# Patient Record
Sex: FEMALE | Race: WHITE | NOT HISPANIC OR LATINO | URBAN - METROPOLITAN AREA
[De-identification: names, ages, dates, MRNs, and addresses within clinical notes are randomized per-mention and may not be internally consistent; named-entity substitution may affect disease eponyms.]

---

## 2017-07-18 ENCOUNTER — OUTPATIENT (OUTPATIENT)
Dept: OUTPATIENT SERVICES | Facility: HOSPITAL | Age: 77
LOS: 1 days | End: 2017-07-18
Payer: MEDICARE

## 2017-07-18 ENCOUNTER — APPOINTMENT (OUTPATIENT)
Dept: CARDIOLOGY | Facility: CLINIC | Age: 77
End: 2017-07-18

## 2017-07-18 ENCOUNTER — TRANSCRIPTION ENCOUNTER (OUTPATIENT)
Age: 77
End: 2017-07-18

## 2017-07-18 ENCOUNTER — NON-APPOINTMENT (OUTPATIENT)
Age: 77
End: 2017-07-18

## 2017-07-18 VITALS
OXYGEN SATURATION: 100 % | HEART RATE: 81 BPM | BODY MASS INDEX: 27.88 KG/M2 | TEMPERATURE: 98.8 F | HEIGHT: 60 IN | DIASTOLIC BLOOD PRESSURE: 88 MMHG | WEIGHT: 142 LBS | SYSTOLIC BLOOD PRESSURE: 168 MMHG

## 2017-07-18 VITALS
DIASTOLIC BLOOD PRESSURE: 92 MMHG | HEIGHT: 59 IN | HEART RATE: 91 BPM | SYSTOLIC BLOOD PRESSURE: 194 MMHG | RESPIRATION RATE: 16 BRPM | TEMPERATURE: 98 F | OXYGEN SATURATION: 99 % | WEIGHT: 141.98 LBS

## 2017-07-18 DIAGNOSIS — Z80.9 FAMILY HISTORY OF MALIGNANT NEOPLASM, UNSPECIFIED: ICD-10-CM

## 2017-07-18 DIAGNOSIS — Z84.89 FAMILY HISTORY OF OTHER SPECIFIED CONDITIONS: Chronic | ICD-10-CM

## 2017-07-18 DIAGNOSIS — R07.89 OTHER CHEST PAIN: ICD-10-CM

## 2017-07-18 DIAGNOSIS — Z78.9 OTHER SPECIFIED HEALTH STATUS: ICD-10-CM

## 2017-07-18 DIAGNOSIS — R07.9 CHEST PAIN, UNSPECIFIED: ICD-10-CM

## 2017-07-18 LAB
ALBUMIN SERPL ELPH-MCNC: 4.5 G/DL — SIGNIFICANT CHANGE UP (ref 3.3–5)
ALP SERPL-CCNC: 38 U/L — LOW (ref 40–120)
ALT FLD-CCNC: 16 U/L RC — SIGNIFICANT CHANGE UP (ref 10–45)
ANION GAP SERPL CALC-SCNC: 16 MMOL/L — SIGNIFICANT CHANGE UP (ref 5–17)
AST SERPL-CCNC: 22 U/L — SIGNIFICANT CHANGE UP (ref 10–40)
BILIRUB SERPL-MCNC: 0.4 MG/DL — SIGNIFICANT CHANGE UP (ref 0.2–1.2)
BUN SERPL-MCNC: 17 MG/DL — SIGNIFICANT CHANGE UP (ref 7–23)
CALCIUM SERPL-MCNC: 9.9 MG/DL — SIGNIFICANT CHANGE UP (ref 8.4–10.5)
CHLORIDE SERPL-SCNC: 105 MMOL/L — SIGNIFICANT CHANGE UP (ref 96–108)
CHOLEST SERPL-MCNC: 167 MG/DL — SIGNIFICANT CHANGE UP (ref 10–199)
CO2 SERPL-SCNC: 23 MMOL/L — SIGNIFICANT CHANGE UP (ref 22–31)
CREAT SERPL-MCNC: 0.78 MG/DL — SIGNIFICANT CHANGE UP (ref 0.5–1.3)
GLUCOSE SERPL-MCNC: 147 MG/DL — HIGH (ref 70–99)
HBA1C BLD-MCNC: 5.6 % — SIGNIFICANT CHANGE UP (ref 4–5.6)
HCT VFR BLD CALC: 31.2 % — LOW (ref 34.5–45)
HGB BLD-MCNC: 10.2 G/DL — LOW (ref 11.5–15.5)
MCHC RBC-ENTMCNC: 21 PG — LOW (ref 27–34)
MCHC RBC-ENTMCNC: 32.6 GM/DL — SIGNIFICANT CHANGE UP (ref 32–36)
MCV RBC AUTO: 64.4 FL — LOW (ref 80–100)
PLATELET # BLD AUTO: 218 K/UL — SIGNIFICANT CHANGE UP (ref 150–400)
POTASSIUM SERPL-MCNC: 4.1 MMOL/L — SIGNIFICANT CHANGE UP (ref 3.5–5.3)
POTASSIUM SERPL-SCNC: 4.1 MMOL/L — SIGNIFICANT CHANGE UP (ref 3.5–5.3)
PROT SERPL-MCNC: 7 G/DL — SIGNIFICANT CHANGE UP (ref 6–8.3)
RBC # BLD: 4.84 M/UL — SIGNIFICANT CHANGE UP (ref 3.8–5.2)
RBC # FLD: 15 % — HIGH (ref 10.3–14.5)
SODIUM SERPL-SCNC: 144 MMOL/L — SIGNIFICANT CHANGE UP (ref 135–145)
WBC # BLD: 16.3 K/UL — HIGH (ref 3.8–10.5)
WBC # FLD AUTO: 16.3 K/UL — HIGH (ref 3.8–10.5)

## 2017-07-18 PROCEDURE — 93010 ELECTROCARDIOGRAM REPORT: CPT

## 2017-07-18 RX ORDER — SODIUM CHLORIDE 9 MG/ML
1000 INJECTION, SOLUTION INTRAVENOUS
Qty: 0 | Refills: 0 | Status: DISCONTINUED | OUTPATIENT
Start: 2017-07-18 | End: 2017-07-19

## 2017-07-18 RX ORDER — DEXTROSE 50 % IN WATER 50 %
1 SYRINGE (ML) INTRAVENOUS ONCE
Qty: 0 | Refills: 0 | Status: DISCONTINUED | OUTPATIENT
Start: 2017-07-18 | End: 2017-07-19

## 2017-07-18 RX ORDER — LABETALOL HCL 100 MG
10 TABLET ORAL ONCE
Qty: 0 | Refills: 0 | Status: COMPLETED | OUTPATIENT
Start: 2017-07-18 | End: 2017-07-18

## 2017-07-18 RX ORDER — INSULIN LISPRO 100/ML
VIAL (ML) SUBCUTANEOUS AT BEDTIME
Qty: 0 | Refills: 0 | Status: DISCONTINUED | OUTPATIENT
Start: 2017-07-18 | End: 2017-07-19

## 2017-07-18 RX ORDER — DEXTROSE 50 % IN WATER 50 %
12.5 SYRINGE (ML) INTRAVENOUS ONCE
Qty: 0 | Refills: 0 | Status: DISCONTINUED | OUTPATIENT
Start: 2017-07-18 | End: 2017-07-19

## 2017-07-18 RX ORDER — ATORVASTATIN CALCIUM 80 MG/1
20 TABLET, FILM COATED ORAL AT BEDTIME
Qty: 0 | Refills: 0 | Status: DISCONTINUED | OUTPATIENT
Start: 2017-07-18 | End: 2017-07-19

## 2017-07-18 RX ORDER — NEOMYCIN AND POLYMYXIN B SULFATES AND DEXAMETHASONE 3.5; 10000; 1 MG/G; [IU]/G; MG/G
3.5-10000-0.1 OINTMENT OPHTHALMIC
Qty: 4 | Refills: 0 | Status: COMPLETED | COMMUNITY
Start: 2017-03-09

## 2017-07-18 RX ORDER — METOPROLOL TARTRATE 50 MG
1 TABLET ORAL
Qty: 30 | Refills: 0 | OUTPATIENT
Start: 2017-07-18 | End: 2017-08-17

## 2017-07-18 RX ORDER — DEXTROSE 50 % IN WATER 50 %
25 SYRINGE (ML) INTRAVENOUS ONCE
Qty: 0 | Refills: 0 | Status: DISCONTINUED | OUTPATIENT
Start: 2017-07-18 | End: 2017-07-19

## 2017-07-18 RX ORDER — INSULIN LISPRO 100/ML
VIAL (ML) SUBCUTANEOUS
Qty: 0 | Refills: 0 | Status: DISCONTINUED | OUTPATIENT
Start: 2017-07-18 | End: 2017-07-19

## 2017-07-18 RX ORDER — BLOOD SUGAR DIAGNOSTIC
STRIP MISCELLANEOUS
Qty: 100 | Refills: 0 | Status: ACTIVE | COMMUNITY
Start: 2017-04-14

## 2017-07-18 RX ORDER — METOPROLOL TARTRATE 50 MG
25 TABLET ORAL DAILY
Qty: 0 | Refills: 0 | Status: DISCONTINUED | OUTPATIENT
Start: 2017-07-18 | End: 2017-07-19

## 2017-07-18 RX ORDER — SITAGLIPTIN AND METFORMIN HYDROCHLORIDE 50; 1000 MG/1; MG/1
50-1000 TABLET, FILM COATED ORAL DAILY
Qty: 90 | Refills: 1 | Status: DISCONTINUED | COMMUNITY
Start: 2017-07-18 | End: 2017-07-18

## 2017-07-18 RX ORDER — GLUCAGON INJECTION, SOLUTION 0.5 MG/.1ML
1 INJECTION, SOLUTION SUBCUTANEOUS ONCE
Qty: 0 | Refills: 0 | Status: DISCONTINUED | OUTPATIENT
Start: 2017-07-18 | End: 2017-07-19

## 2017-07-18 RX ORDER — ASPIRIN/CALCIUM CARB/MAGNESIUM 324 MG
81 TABLET ORAL DAILY
Qty: 0 | Refills: 0 | Status: DISCONTINUED | OUTPATIENT
Start: 2017-07-18 | End: 2017-07-18

## 2017-07-18 RX ORDER — CLOPIDOGREL BISULFATE 75 MG/1
75 TABLET, FILM COATED ORAL DAILY
Qty: 0 | Refills: 0 | Status: DISCONTINUED | OUTPATIENT
Start: 2017-07-18 | End: 2017-07-19

## 2017-07-18 RX ORDER — CLOPIDOGREL BISULFATE 75 MG/1
1 TABLET, FILM COATED ORAL
Qty: 90 | Refills: 3 | OUTPATIENT
Start: 2017-07-18 | End: 2018-07-12

## 2017-07-18 RX ORDER — ASPIRIN/CALCIUM CARB/MAGNESIUM 324 MG
81 TABLET ORAL DAILY
Qty: 0 | Refills: 0 | Status: DISCONTINUED | OUTPATIENT
Start: 2017-07-18 | End: 2017-07-19

## 2017-07-18 RX ORDER — ATORVASTATIN CALCIUM 80 MG/1
1 TABLET, FILM COATED ORAL
Qty: 30 | Refills: 0 | OUTPATIENT
Start: 2017-07-18 | End: 2017-08-17

## 2017-07-18 RX ORDER — PREDNISOLONE ACETATE 10 MG/ML
1 SUSPENSION/ DROPS OPHTHALMIC
Qty: 15 | Refills: 0 | Status: COMPLETED | COMMUNITY
Start: 2017-04-10

## 2017-07-18 RX ADMIN — ATORVASTATIN CALCIUM 20 MILLIGRAM(S): 80 TABLET, FILM COATED ORAL at 21:11

## 2017-07-18 RX ADMIN — Medication 10 MILLIGRAM(S): at 10:35

## 2017-07-18 NOTE — DISCHARGE NOTE ADULT - HOSPITAL COURSE
HPI:  This is a 77 year old female with PMH of DMT2 ( well managed by Md Hurley, last A1C unknown, w/o any complications), CLL (on surveillance by Md Leigh). Denies any other significant PMH or PSH, however both brothers had MI 40's and 60's.  Presents for the first time to cardiologist Devon Bean this am, and Md stated the following:"   I had the pleasure of seeing Mrs. Joan Kelly on July 18, 2017 for evaluation of chest pain. The patient has a long history of type 2 diabetes. 2 days ago she will from a nap and began to feel anterior chest pain and pain in both arms. She had no associated symptoms. The pain was not altered by breathing or movement. It lasted for 1-2 hours. That night she will get about 2:30 AM with the same chest pain radiating down both arms. She had no associated shortness of breath, nausea, or diaphoresis. The pain lasted for 4-5 hours. She also experienced about 5 minutes of chest pain after lifting some laundry. This morning she awoke with left arm pain similar to what she had experienced 2 days ago. The pain is still there. She has no associated chest pain or associated symptoms. The patient had been on statins for many years; however, because her cholesterol is so low, the statins were stopped about 3 years ago. She has no history of hypertension. [id number]      (above note from Md office retrieved from [employer])  in Md office 12 lead EKG showed changes when compared to last EKG in 03/2014.  Now found to have T wave inversions in leads II, III, V3-V6. Given EKG findings and symptomatology, patient referred to cardiac cath with possible intervention.  Currently verbalizes occasional left arm pain, non radiating, unable to describe the type of pain and unable to stage pain from a scale of 0  to 10.  Patient first language is Nepalese, proficient in English language, however even when prompted in Nepalese pt unable to give more details about pain.  Denies any other associated symptoms such as dyspnea, dizziness, palpitations, N&V, HA.  Denies from ever having ECHO or stress test in her life. (18 Jul 2017 09:49)

## 2017-07-18 NOTE — DISCHARGE NOTE ADULT - CARE PROVIDERS DIRECT ADDRESSES
,[email or web address]

## 2017-07-18 NOTE — DISCHARGE NOTE ADULT - PATIENT PORTAL LINK FT
“You can access the Follow[facility] Patient Portal, offered by [facility], by registering with the following website: [email or web address]”

## 2017-07-18 NOTE — H&P CARDIOLOGY - FAMILY HISTORY
No pertinent family history in first degree relatives Sibling  Still living? Unknown  FH: MI (myocardial infarction), Age at diagnosis: Age Unknown

## 2017-07-18 NOTE — DISCHARGE NOTE ADULT - MEDICATION SUMMARY - MEDICATIONS TO TAKE
I will START or STAY ON the medications listed below when I get home from the hospital:    aspirin 81 mg oral delayed release tablet  -- 1 tab(s) by mouth once a day  -- Indication: For coronary artery disease    Janumet 1000 mg-50 mg oral tablet  -- 1 tab(s) by mouth once a day  resume Friday 7/21/17  -- Rx by md de souza  -- Indication: For diabetes    atorvastatin 20 mg oral tablet  -- 1 tab(s) by mouth once a day (at bedtime) MDD:1 tab/day  -- Indication: For hyperlipidemia    clopidogrel 75 mg oral tablet  -- 1 tab(s) by mouth once a day MDD:1 tab/day  -- Indication: For antiplatelet aggregate for stent    metoprolol succinate 25 mg oral tablet, extended release  -- 1 tab(s) by mouth once a day MDD:1 tab/day  -- Indication: For hypertension I will START or STAY ON the medications listed below when I get home from the hospital:    aspirin 81 mg oral delayed release tablet  -- 1 tab(s) by mouth once a day  -- Indication: For coronary artery disease    Janumet 1000 mg-50 mg oral tablet  -- 1 tab(s) by mouth once a day  resume Friday 7/21/17  -- Rx by md de souza  -- Indication: For diabetes    atorvastatin 20 mg oral tablet  -- 1 tab(s) by mouth once a day (at bedtime) MDD:1 tab/day  -- Indication: For hyperlipidemia    clopidogrel 75 mg oral tablet  -- 1 tab(s) by mouth once a day MDD:1 tab/day  -- Indication: For antiplatelet aggregate for stent    metoprolol succinate 25 mg oral tablet, extended release  -- 1 tab(s) by mouth once a day MDD:1 tab/day  -- Indication: For hypertension    pantoprazole 40 mg oral delayed release tablet  -- 1 tab(s) by mouth once a day MDD:1  -- It is very important that you take or use this exactly as directed.  Do not skip doses or discontinue unless directed by your doctor.  Obtain medical advice before taking any non-prescription drugs as some may affect the action of this medication.  Swallow whole.  Do not crush.    -- Indication: For Stomach upset with medications

## 2017-07-18 NOTE — CHART NOTE - NSCHARTNOTEFT_GEN_A_CORE
Removal of Femoral Sheath    Pulses in the right lower extremity are (palpable/audible by doppler/absent). The patient was placed in the supine position. The insertion site was identified and the sutures were removed per protocol.  The 6 [place] femoral sheath was then removed. Direct pressure was applied for 20 minutes.     Monitoring of the right groin and both lower extremities including neuro-vascular checks and vital signs every 15 minutes x 4, then every 30 minutes x 2, then every 1 hour x 4 was ordered.    Complications: None    Comments: pt. verbalized understanding of reportable symptoms, DAPT.

## 2017-07-18 NOTE — DISCHARGE NOTE ADULT - CARE PROVIDER_API CALL
Devon Watts), Cardiovascular Disease; Internal Medicine  [address] [place] [address]  Phone: [phone]  Fax: [phone] Devon Watts (MD), Cardiovascular Disease; Internal Medicine  [address] [place] [address]  Phone: [phone]  Fax: [phone]    Trevor Tang), Internal Medicine  [address] [facility] [address]  [place], [place] [id number]  Phone: [phone]  Fax: [phone]

## 2017-07-18 NOTE — DISCHARGE NOTE ADULT - CONDITION (STATED IN TERMS THAT PERMIT A SPECIFIC MEASURABLE COMPARISON WITH CONDITION ON ADMISSION):
Status post cardiac catheterization, PCI/Stent.   Tolerated procedure well, vital signs and telemetry stable.  Ambulating without complaint.  Hospital course uneventful. RFA site WDL. Stable for discharge. Status post cardiac catheterization, PCI/Stent with no cp, sob or palpitations, tele events or ecg changes  Tolerated procedure well, vital signs and telemetry stable.  Ambulating without complaint.  Hospital course uneventful. RFA site WDL. Stable for discharge.

## 2017-07-18 NOTE — H&P CARDIOLOGY - HISTORY OF PRESENT ILLNESS
This is a 77 year old female with PMH of HTN, HLD, DMT2 ( well managed by Md Hurley, last A1C unknown, w/o any complications), CLL (on surveillance) and gall stones who presents This is a 77 year old female with PMH of DMT2 ( well managed by Md Hurley, last A1C unknown, w/o any complications), CLL (on surveillance by Md Leigh). Denies any other significant PMH or PSH, however both brothers had MI 40's and 60's.  Presents for the first time to cardiologist Devon Bean this am, and Md stated the following:"   I had the pleasure of seeing Mrs. Joan Kelly on July 18, 2017 for evaluation of chest pain. The patient has a long history of type 2 diabetes. 2 days ago she will from a nap and began to feel anterior chest pain and pain in both arms. She had no associated symptoms. The pain was not altered by breathing or movement. It lasted for 1-2 hours. That night she will get about 2:30 AM with the same chest pain radiating down both arms. She had no associated shortness of breath, nausea, or diaphoresis. The pain lasted for 4-5 hours. She also experienced about 5 minutes of chest pain after lifting some laundry. This morning she awoke with left arm pain similar to what she had experienced 2 days ago. The pain is still there. She has no associated chest pain or associated symptoms. The patient had been on statins for many years; however, because her cholesterol is so low, the statins were stopped about 3 years ago. She has no history of hypertension. [id number]      (above note from Md office retrieved from [employer])  in Md office 12 lead EKG showed changes when compared to last EKG in 03/2014.  Now found to have T wave inversions in leads II, III, V3-V6. Given EKG findings and symptomatology, patient referred to cardiac cath with possible intervention.  Currently verbalizes occasional left arm pain, non radiating, unable to describe the type of pain and unable to stage pain from a scale of 0  to 10.  Patient first language is Icelandic, proficient in English language, however even when prompted in Icelandic pt unable to give more details about pain.  Denies any other associated symptoms such as dyspnea, dizziness, palpitations, N&V, HA.  Denies from ever having ECHO or stress test in her life.

## 2017-07-18 NOTE — H&P CARDIOLOGY - PMH
CLL (chronic lymphocytic leukemia)  diagnosed 2001, had not had any treatment  DM type 2 (diabetes mellitus, type 2)  diagnosed age 70  HLD (hyperlipidemia)  now ok, taken off medication per pt  HTN (hypertension)    Osteoarthritis, knee  right, resolved s/p cortisone injection x1  Osteoarthritis, shoulder, right  resolved s/p cortisone injection x1 CLL (chronic lymphocytic leukemia)  diagnosed 2001, had not had any treatment  DM type 2 (diabetes mellitus, type 2)  diagnosed age 70  Osteoarthritis, knee  right, resolved s/p cortisone injection x1  Osteoarthritis, shoulder, right  resolved s/p cortisone injection x1

## 2017-07-18 NOTE — DISCHARGE NOTE ADULT - CARE PLAN
Principal Discharge DX:	Coronary artery disease  Goal:	Pt remains chest pain free and understands post cath discharge instructions  Instructions for follow-up, activity and diet:	No heavy lifting, strenuous activity, bending, straining or unneccessary stair climbing  for 2 weeks. No sex for 1 week.  No driving for 2 days. You may shower 24 hours following procedure but avoid baths and swimming for 1 week. Check groin site for bleeding and/or swelling daily following procedure. Call your doctor/cardiologist immediately should it occur or if you have increased/persistent pain at the site. Follow up with your cardiologist in 1- 2 weeks. You may call [facility] at [phone] or [phone] after office hours and weekends  with any questions or concerns following your procedure. Take medications as prescribed.  Secondary Diagnosis:	Hyperlipidemia  Goal:	LDL<70  Instructions for follow-up, activity and diet:	Goal is to keep LDL<70. Continue with your cholesterol medications as prescribed. Eat a heart healthy diet that is low in saturated fats and salt, and includes whole grains, fruits, vegetables and lean protein; exercise regularly (consult with your physician or cardiologist first); maintain a heart healthy weight; if you smoke - quit (A resource to help you stop smoking is the [facility] – phone number [phone].). Continue to follow with your primary physician or cardiologist.  Secondary Diagnosis:	DM type 2 (diabetes mellitus, type 2)  Goal:	Your hemoglobin A1C will be between 7-8.  Instructions for follow-up, activity and diet:	Continue to follow with your primary care MD or your endocrinologist.  Follow a heart healthy diabetic diet. If you check your fingerstick glucose at home, call your MD if it is greater than 250mg/dL on 2 occasions or less than 100mg/dL on 2 occasions. Know signs of low blood sugar, such as: dizziness, shakiness, sweating, confusion, hunger, nervousness-drink 4 ounces apple juice if occurs and call your doctor. Know early signs of high blood sugar, such as: frequent urination, increased thirst, blurry vision, fatigue, headache - call your doctor if this occurs. Follow with other practitioners to care for your diabetes, such as ophthamologist and podiatrist.  Instructions for follow-up, activity and diet:	do not stop your aspirin or plavix unless instructed to do so by your cardiologist Principal Discharge DX:	Coronary artery disease  Goal:	Pt remains chest pain free and understands post cath discharge instructions  Instructions for follow-up, activity and diet:	No heavy lifting, strenuous activity, bending, straining or unneccessary stair climbing  for 2 weeks. No sex for 1 week.  No driving for 2 days. You may shower 24 hours following procedure but avoid baths and swimming for 1 week. Check groin site for bleeding and/or swelling daily following procedure. Call your doctor/cardiologist immediately should it occur or if you have increased/persistent pain at the site. Follow up with your cardiologist in 1- 2 weeks. You may call [facility] at [phone] or [phone] after office hours and weekends  with any questions or concerns following your procedure. Take medications as prescribed.  Secondary Diagnosis:	Hyperlipidemia  Goal:	LDL<70  Instructions for follow-up, activity and diet:	Goal is to keep LDL<70. Continue with your cholesterol medications as prescribed. Eat a heart healthy diet that is low in saturated fats and salt, and includes whole grains, fruits, vegetables and lean protein; exercise regularly (consult with your physician or cardiologist first); maintain a heart healthy weight; if you smoke - quit (A resource to help you stop smoking is the [facility] – phone number 645-131-0775.). Continue to follow with your primary physician or cardiologist.  Secondary Diagnosis:	DM type 2 (diabetes mellitus, type 2)  Goal:	Your hemoglobin A1C will be between 7-8.  Instructions for follow-up, activity and diet:	Continue to follow with your primary care MD or your endocrinologist.  Follow a heart healthy diabetic diet. If you check your fingerstick glucose at home, call your MD if it is greater than 250mg/dL on 2 occasions or less than 100mg/dL on 2 occasions. Know signs of low blood sugar, such as: dizziness, shakiness, sweating, confusion, hunger, nervousness-drink 4 ounces apple juice if occurs and call your doctor. Know early signs of high blood sugar, such as: frequent urination, increased thirst, blurry vision, fatigue, headache - call your doctor if this occurs. Follow with other practitioners to care for your diabetes, such as ophthamologist and podiatrist.  Instructions for follow-up, activity and diet:	do not stop your aspirin or plavix unless instructed to do so by your cardiologist Principal Discharge DX:	Coronary artery disease  Goal:	Pt remains chest pain free and understands post cath discharge instructions  Instructions for follow-up, activity and diet:	No heavy lifting, strenuous activity, bending, straining or unnecessary stair climbing  for 2 weeks. No sex for 1 week.  No driving for 2 days. You may shower 24 hours following procedure but avoid baths and swimming for 1 week. Check groin site for bleeding and/or swelling daily following procedure. Call your doctor/cardiologist immediately should it occur or if you have increased/persistent pain at the site. Follow up with your cardiologist in 1- 2 weeks. You may call [facility] at [phone] or [phone] after office hours and weekends  with any questions or concerns following your procedure. Take medications as prescribed.  Secondary Diagnosis:	Hyperlipidemia  Goal:	LDL<70  Instructions for follow-up, activity and diet:	Goal is to keep LDL<70. Continue with your cholesterol medications as prescribed. Eat a heart healthy diet that is low in saturated fats and salt, and includes whole grains, fruits, vegetables and lean protein; exercise regularly (consult with your physician or cardiologist first); maintain a heart healthy weight; if you smoke - quit (A resource to help you stop smoking is the [facility] – phone number 860-310-7645.). Continue to follow with your primary physician or cardiologist.  Secondary Diagnosis:	DM type 2 (diabetes mellitus, type 2)  Goal:	Your hemoglobin A1C will be between 7-8.  Instructions for follow-up, activity and diet:	Continue to follow with your primary care MD or your endocrinologist.  Follow a heart healthy diabetic diet. If you check your fingerstick glucose at home, call your MD if it is greater than 250mg/dL on 2 occasions or less than 100mg/dL on 2 occasions. Know signs of low blood sugar, such as: dizziness, shakiness, sweating, confusion, hunger, nervousness-drink 4 ounces apple juice if occurs and call your doctor. Know early signs of high blood sugar, such as: frequent urination, increased thirst, blurry vision, fatigue, headache - call your doctor if this occurs. Follow with other practitioners to care for your diabetes, such as ophthamologist and podiatrist.  Instructions for follow-up, activity and diet:	do not stop your aspirin or Plavix unless instructed to do so by your cardiologist Principal Discharge DX:	Coronary artery disease  Goal:	Pt remains chest pain free and understands post cath discharge instructions  Instructions for follow-up, activity and diet:	No heavy lifting, strenuous activity, bending, straining or unnecessary stair climbing  for 2 weeks. No sex for 1 week.  No driving for 2 days. You may shower 24 hours following procedure but avoid baths and swimming for 1 week. Check groin site for bleeding and/or swelling daily following procedure. Call your doctor/cardiologist immediately should it occur or if you have increased/persistent pain at the site. Follow up with your cardiologist in 1- 2 weeks. You may call [facility] at [phone] or [phone] after office hours and weekends  with any questions or concerns following your procedure. Take medications as prescribed.  Secondary Diagnosis:	Hyperlipidemia  Goal:	LDL<70  Instructions for follow-up, activity and diet:	Goal is to keep LDL<70. Continue with your cholesterol medications as prescribed. Eat a heart healthy diet that is low in saturated fats and salt, and includes whole grains, fruits, vegetables and lean protein; exercise regularly (consult with your physician or cardiologist first); maintain a heart healthy weight; if you smoke - quit (A resource to help you stop smoking is the [facility] – phone number [phone].). Continue to follow with your primary physician or cardiologist.  Secondary Diagnosis:	DM type 2 (diabetes mellitus, type 2)  Goal:	Your hemoglobin A1C will be between 7-8.  Instructions for follow-up, activity and diet:	Continue to follow with your primary care MD or your endocrinologist.  Follow a heart healthy diabetic diet. If you check your fingerstick glucose at home, call your MD if it is greater than 250mg/dL on 2 occasions or less than 100mg/dL on 2 occasions. Know signs of low blood sugar, such as: dizziness, shakiness, sweating, confusion, hunger, nervousness-drink 4 ounces apple juice if occurs and call your doctor. Know early signs of high blood sugar, such as: frequent urination, increased thirst, blurry vision, fatigue, headache - call your doctor if this occurs. Follow with other practitioners to care for your diabetes, such as ophthamologist and podiatrist.  Instructions for follow-up, activity and diet:	do not stop your aspirin or Plavix unless instructed to do so by your cardiologist Principal Discharge DX:	Coronary artery disease  Goal:	Pt remains chest pain free and understands post cath discharge instructions  Instructions for follow-up, activity and diet:	No heavy lifting, strenuous activity, bending, straining or unnecessary stair climbing  for 2 weeks. No sex for 1 week.  No driving for 2 days. You may shower 24 hours following procedure but avoid baths and swimming for 1 week. Check groin site for bleeding and/or swelling daily following procedure. Call your doctor/cardiologist immediately should it occur or if you have increased/persistent pain at the site. Follow up with your cardiologist in 1- 2 weeks. You may call [facility] at [phone] or [phone] after office hours and weekends  with any questions or concerns following your procedure. Take medications as prescribed.  Secondary Diagnosis:	Hyperlipidemia  Goal:	LDL<70  Instructions for follow-up, activity and diet:	Goal is to keep LDL<70. Continue with your cholesterol medications as prescribed. Eat a heart healthy diet that is low in saturated fats and salt, and includes whole grains, fruits, vegetables and lean protein; exercise regularly (consult with your physician or cardiologist first); maintain a heart healthy weight; if you smoke - quit (A resource to help you stop smoking is the [facility] – phone number 299-022-0224.). Continue to follow with your primary physician or cardiologist.  Secondary Diagnosis:	DM type 2 (diabetes mellitus, type 2)  Goal:	Your hemoglobin A1C will be between 7-8.  Instructions for follow-up, activity and diet:	Continue to follow with your primary care MD or your endocrinologist.  Follow a heart healthy diabetic diet. If you check your fingerstick glucose at home, call your MD if it is greater than 250mg/dL on 2 occasions or less than 100mg/dL on 2 occasions. Know signs of low blood sugar, such as: dizziness, shakiness, sweating, confusion, hunger, nervousness-drink 4 ounces apple juice if occurs and call your doctor. Know early signs of high blood sugar, such as: frequent urination, increased thirst, blurry vision, fatigue, headache - call your doctor if this occurs. Follow with other practitioners to care for your diabetes, such as ophthamologist and podiatrist.  Instructions for follow-up, activity and diet:	do not stop your aspirin or Plavix unless instructed to do so by your cardiologist

## 2017-07-18 NOTE — H&P CARDIOLOGY - EKG AND INTERPRETATION
NSR at 91 bpm   ST and T wave abnormal findings in multiple leads  T wave inversions noted in leads II, III, V4-V6

## 2017-07-18 NOTE — DISCHARGE NOTE ADULT - PLAN OF CARE
Pt remains chest pain free and understands post cath discharge instructions No heavy lifting, strenuous activity, bending, straining or unneccessary stair climbing  for 2 weeks. No sex for 1 week.  No driving for 2 days. You may shower 24 hours following procedure but avoid baths and swimming for 1 week. Check groin site for bleeding and/or swelling daily following procedure. Call your doctor/cardiologist immediately should it occur or if you have increased/persistent pain at the site. Follow up with your cardiologist in 1- 2 weeks. You may call [facility] at [phone] or [phone] after office hours and weekends  with any questions or concerns following your procedure. Take medications as prescribed. LDL<70 Goal is to keep LDL<70. Continue with your cholesterol medications as prescribed. Eat a heart healthy diet that is low in saturated fats and salt, and includes whole grains, fruits, vegetables and lean protein; exercise regularly (consult with your physician or cardiologist first); maintain a heart healthy weight; if you smoke - quit (A resource to help you stop smoking is the [facility] – phone number [phone].). Continue to follow with your primary physician or cardiologist. Your hemoglobin A1C will be between 7-8. Continue to follow with your primary care MD or your endocrinologist.  Follow a heart healthy diabetic diet. If you check your fingerstick glucose at home, call your MD if it is greater than 250mg/dL on 2 occasions or less than 100mg/dL on 2 occasions. Know signs of low blood sugar, such as: dizziness, shakiness, sweating, confusion, hunger, nervousness-drink 4 ounces apple juice if occurs and call your doctor. Know early signs of high blood sugar, such as: frequent urination, increased thirst, blurry vision, fatigue, headache - call your doctor if this occurs. Follow with other practitioners to care for your diabetes, such as ophthamologist and podiatrist. do not stop your aspirin or plavix unless instructed to do so by your cardiologist No heavy lifting, strenuous activity, bending, straining or unnecessary stair climbing  for 2 weeks. No sex for 1 week.  No driving for 2 days. You may shower 24 hours following procedure but avoid baths and swimming for 1 week. Check groin site for bleeding and/or swelling daily following procedure. Call your doctor/cardiologist immediately should it occur or if you have increased/persistent pain at the site. Follow up with your cardiologist in 1- 2 weeks. You may call [facility] at [phone] or [phone] after office hours and weekends  with any questions or concerns following your procedure. Take medications as prescribed. do not stop your aspirin or Plavix unless instructed to do so by your cardiologist

## 2017-07-19 VITALS
DIASTOLIC BLOOD PRESSURE: 80 MMHG | TEMPERATURE: 98 F | OXYGEN SATURATION: 98 % | RESPIRATION RATE: 17 BRPM | HEART RATE: 77 BPM | SYSTOLIC BLOOD PRESSURE: 137 MMHG

## 2017-07-19 DIAGNOSIS — E78.5 HYPERLIPIDEMIA, UNSPECIFIED: ICD-10-CM

## 2017-07-19 DIAGNOSIS — I25.118 ATHEROSCLEROTIC HEART DISEASE OF NATIVE CORONARY ARTERY WITH OTHER FORMS OF ANGINA PECTORIS: ICD-10-CM

## 2017-07-19 DIAGNOSIS — E11.9 TYPE 2 DIABETES MELLITUS WITHOUT COMPLICATIONS: ICD-10-CM

## 2017-07-19 LAB
ANION GAP SERPL CALC-SCNC: 12 MMOL/L — SIGNIFICANT CHANGE UP (ref 5–17)
BASOPHILS # BLD AUTO: 0.1 K/UL — SIGNIFICANT CHANGE UP (ref 0–0.2)
BASOPHILS NFR BLD AUTO: 0.7 % — SIGNIFICANT CHANGE UP (ref 0–2)
BUN SERPL-MCNC: 16 MG/DL — SIGNIFICANT CHANGE UP (ref 7–23)
CALCIUM SERPL-MCNC: 9.1 MG/DL — SIGNIFICANT CHANGE UP (ref 8.4–10.5)
CHLORIDE SERPL-SCNC: 107 MMOL/L — SIGNIFICANT CHANGE UP (ref 96–108)
CO2 SERPL-SCNC: 25 MMOL/L — SIGNIFICANT CHANGE UP (ref 22–31)
CREAT SERPL-MCNC: 0.84 MG/DL — SIGNIFICANT CHANGE UP (ref 0.5–1.3)
EOSINOPHIL # BLD AUTO: 0.2 K/UL — SIGNIFICANT CHANGE UP (ref 0–0.5)
EOSINOPHIL NFR BLD AUTO: 1.3 % — SIGNIFICANT CHANGE UP (ref 0–6)
GLUCOSE SERPL-MCNC: 105 MG/DL — HIGH (ref 70–99)
HCT VFR BLD CALC: 29 % — LOW (ref 34.5–45)
HGB BLD-MCNC: 9.8 G/DL — LOW (ref 11.5–15.5)
LYMPHOCYTES # BLD AUTO: 54.2 % — HIGH (ref 13–44)
LYMPHOCYTES # BLD AUTO: 8.8 K/UL — HIGH (ref 1–3.3)
MCHC RBC-ENTMCNC: 21.5 PG — LOW (ref 27–34)
MCHC RBC-ENTMCNC: 34 GM/DL — SIGNIFICANT CHANGE UP (ref 32–36)
MCV RBC AUTO: 63.3 FL — LOW (ref 80–100)
MONOCYTES # BLD AUTO: 0.8 K/UL — SIGNIFICANT CHANGE UP (ref 0–0.9)
MONOCYTES NFR BLD AUTO: 4.8 % — SIGNIFICANT CHANGE UP (ref 2–14)
NEUTROPHILS # BLD AUTO: 6.3 K/UL — SIGNIFICANT CHANGE UP (ref 1.8–7.4)
NEUTROPHILS NFR BLD AUTO: 39.1 % — LOW (ref 43–77)
PLATELET # BLD AUTO: 185 K/UL — SIGNIFICANT CHANGE UP (ref 150–400)
POTASSIUM SERPL-MCNC: 5.2 MMOL/L — SIGNIFICANT CHANGE UP (ref 3.5–5.3)
POTASSIUM SERPL-SCNC: 5.2 MMOL/L — SIGNIFICANT CHANGE UP (ref 3.5–5.3)
RBC # BLD: 4.57 M/UL — SIGNIFICANT CHANGE UP (ref 3.8–5.2)
RBC # FLD: 14.7 % — HIGH (ref 10.3–14.5)
SODIUM SERPL-SCNC: 144 MMOL/L — SIGNIFICANT CHANGE UP (ref 135–145)
WBC # BLD: 16.2 K/UL — HIGH (ref 3.8–10.5)
WBC # FLD AUTO: 16.2 K/UL — HIGH (ref 3.8–10.5)

## 2017-07-19 PROCEDURE — 80053 COMPREHEN METABOLIC PANEL: CPT

## 2017-07-19 PROCEDURE — C1753: CPT

## 2017-07-19 PROCEDURE — 92978 ENDOLUMINL IVUS OCT C 1ST: CPT | Mod: RC

## 2017-07-19 PROCEDURE — 82465 ASSAY BLD/SERUM CHOLESTEROL: CPT

## 2017-07-19 PROCEDURE — 85027 COMPLETE CBC AUTOMATED: CPT

## 2017-07-19 PROCEDURE — 93005 ELECTROCARDIOGRAM TRACING: CPT

## 2017-07-19 PROCEDURE — 93458 L HRT ARTERY/VENTRICLE ANGIO: CPT | Mod: 59

## 2017-07-19 PROCEDURE — 92928 PRQ TCAT PLMT NTRAC ST 1 LES: CPT | Mod: RC

## 2017-07-19 PROCEDURE — 99153 MOD SED SAME PHYS/QHP EA: CPT

## 2017-07-19 PROCEDURE — 99152 MOD SED SAME PHYS/QHP 5/>YRS: CPT

## 2017-07-19 PROCEDURE — C1894: CPT

## 2017-07-19 PROCEDURE — C1725: CPT

## 2017-07-19 PROCEDURE — C1874: CPT

## 2017-07-19 PROCEDURE — C1887: CPT

## 2017-07-19 PROCEDURE — C9600: CPT

## 2017-07-19 PROCEDURE — 83036 HEMOGLOBIN GLYCOSYLATED A1C: CPT

## 2017-07-19 PROCEDURE — 93010 ELECTROCARDIOGRAM REPORT: CPT

## 2017-07-19 PROCEDURE — 80048 BASIC METABOLIC PNL TOTAL CA: CPT

## 2017-07-19 PROCEDURE — C1769: CPT

## 2017-07-19 RX ORDER — PANTOPRAZOLE SODIUM 20 MG/1
1 TABLET, DELAYED RELEASE ORAL
Qty: 30 | Refills: 0 | OUTPATIENT
Start: 2017-07-19 | End: 2017-08-18

## 2017-07-19 RX ADMIN — Medication 25 MILLIGRAM(S): at 05:18

## 2017-07-19 RX ADMIN — Medication 81 MILLIGRAM(S): at 05:18

## 2017-07-19 RX ADMIN — CLOPIDOGREL BISULFATE 75 MILLIGRAM(S): 75 TABLET, FILM COATED ORAL at 05:18

## 2017-07-19 NOTE — PROGRESS NOTE ADULT - SUBJECTIVE AND OBJECTIVE BOX
Patient is a 77y old  Female who presents with a chief complaint of chest pain (2017 18:01)          Allergies    No Known Allergies    Intolerances        Medications:  aspirin enteric coated 81 milliGRAM(s) Oral daily  insulin lispro (HumaLOG) corrective regimen sliding scale   SubCutaneous three times a day before meals  insulin lispro (HumaLOG) corrective regimen sliding scale   SubCutaneous at bedtime  dextrose 5%. 1000 milliLiter(s) IV Continuous <Continuous>  dextrose Gel 1 Dose(s) Oral once PRN  dextrose 50% Injectable 12.5 Gram(s) IV Push once  dextrose 50% Injectable 25 Gram(s) IV Push once  dextrose 50% Injectable 25 Gram(s) IV Push once  glucagon  Injectable 1 milliGRAM(s) IntraMuscular once PRN  clopidogrel Tablet 75 milliGRAM(s) Oral daily  metoprolol succinate ER 25 milliGRAM(s) Oral daily  atorvastatin 20 milliGRAM(s) Oral at bedtime      Vitals:  T(C): 36.6 (17 @ 04:51), Max: 36.9 (17 @ 14:40)  HR: 78 (17 @ 04:51) (69 - 99)  BP: 147/67 (17 @ 04:51) (118/64 - 189/92)  BP(mean): 124 (17 @ 09:49) (124 - 124)  RR: 17 (17 @ 04:51) (16 - 17)  SpO2: 97% (17 @ 04:51) (97% - 100%)  Wt(kg): --  Daily Height in cm: 149.86 (2017 14:40)    Daily Weight in k.4 (2017 14:40)  I&O's Summary    2017 07:01  -  2017 05:30  --------------------------------------------------------  IN: 720 mL / OUT: 800 mL / NET: -80 mL          Physical Exam:  Appearance: Normal  Eyes: PERRL, EOMI  HENT: Normal oral muscosa, NC/AT  Cardiovascular: S1S2, RRR, No M/R/G, no JVD, No Lower extremity edema  Procedural Access Site: No hematoma, Non-tender to palpation, 2+ pulse, No bruit, No Ecchymosis  Respiratory: Clear to auscultation bilaterally  Gastrointestinal: Soft, Non tender, Normal Bowel Sounds  Musculoskeletal: No clubbing, No joint deformity   Neurologic: Non-focal  Lymphatic: No lymphadenopathy  Psychiatry: AAOx3, Mood & affect appropriate  Skin: No rashes, No ecchymoses, No cyanosis        144  |  107  |  16  ----------------------------<  105<H>  5.2   |  25  |  0.84    Ca    9.1      2017 04:59    TPro  7.0  /  Alb  4.5  /  TBili  0.4  /  DBili  x   /  AST  22  /  ALT  16  /  AlkPhos  38<L>              Lipid panel Total Cholesterol: 167  LDL: --  HDL: --  TG: --      Hgb A1c Hemoglobin A1C, Whole Blood: 5.6 % ( @ 16:58)                          9.8    16.2  )-----------( 185      ( 2017 04:59 )             29.0         ECG: SR with LVH 74 bpm    Cath: one stent to the prox LAD, one stent to the distal LAD    Imaging:    Interpretation of Telemetry:

## 2017-07-19 NOTE — PROGRESS NOTE ADULT - ASSESSMENT
HPI:  This is a 77 year old female with PMH of DMT2 ( well managed by Md Hurley, last A1C unknown, w/o any complications), CLL (on surveillance by Md Leigh). Denies any other significant PMH or PSH, however both brothers had MI 40's and 60's.  Presents for the first time to cardiologist Devon Bean this am, and Md stated the following:"   I had the pleasure of seeing Mrs. Joan Kelly on July 18, 2017 for evaluation of chest pain. The patient has a long history of type 2 diabetes. 2 days ago she will from a nap and began to feel anterior chest pain and pain in both arms. She had no associated symptoms. The pain was not altered by breathing or movement. It lasted for 1-2 hours. That night she will get about 2:30 AM with the same chest pain radiating down both arms. She had no associated shortness of breath, nausea, or diaphoresis. The pain lasted for 4-5 hours. She also experienced about 5 minutes of chest pain after lifting some laundry. This morning she awoke with left arm pain similar to what she had experienced 2 days ago. The pain is still there. She has no associated chest pain or associated symptoms. The patient had been on statins for many years; however, because her cholesterol is so low, the statins were stopped about 3 years ago. She has no history of hypertension. [id number]      (above note from Md office retrieved from [employer])  in Md office 12 lead EKG showed changes when compared to last EKG in 03/2014.  Now found to have T wave inversions in leads II, III, V3-V6. Given EKG findings and symptomatology, patient referred to cardiac cath with possible intervention.  Currently verbalizes occasional left arm pain, non radiating, unable to describe the type of pain and unable to stage pain from a scale of 0  to 10.  Patient first language is Kyrgyz, proficient in English language, however even when prompted in Kyrgyz pt unable to give more details about pain.  Denies any other associated symptoms such as dyspnea, dizziness, palpitations, N&V, HA.  Denies from ever having ECHO or stress test in her life. (18 Jul 2017 09:49)

## 2017-07-24 ENCOUNTER — APPOINTMENT (OUTPATIENT)
Dept: CARDIOLOGY | Facility: CLINIC | Age: 77
End: 2017-07-24

## 2017-07-24 ENCOUNTER — NON-APPOINTMENT (OUTPATIENT)
Age: 77
End: 2017-07-24

## 2017-07-24 VITALS
HEIGHT: 60 IN | OXYGEN SATURATION: 98 % | WEIGHT: 142 LBS | SYSTOLIC BLOOD PRESSURE: 157 MMHG | TEMPERATURE: 97.7 F | BODY MASS INDEX: 27.88 KG/M2 | HEART RATE: 84 BPM | DIASTOLIC BLOOD PRESSURE: 73 MMHG

## 2017-07-24 NOTE — HISTORY OF PRESENT ILLNESS
[FreeTextEntry1] : The patient was seen on July 18 with complaints of chest pain and abnormal EKG. She was sent to the hospital where she had a cardiac catheterization. This led to stenting of a severe right coronary artery lesions. The patient has had no further chest pain. She has had no further arm pain.\par \par Patient is concerned about some swelling in her right leg. She has no pain except in the area where they did the femoral puncture for the stenting

## 2017-07-24 NOTE — REASON FOR VISIT
[FreeTextEntry1] : The patient is a 77-year-old woman with diabetes mellitus and arthroscopic heart disease who is here for followup.

## 2017-07-24 NOTE — DISCUSSION/SUMMARY
[FreeTextEntry1] : EKG: Normal sinus rhythm. Nonspecific ST-T changes.\par \par In summary, the patient is a 77-year-old woman who presented last week with unstable angina. Her right coronary artery stented. The patient is feeling well. She has no symptoms of cardiac ischemia.\par \par Plan: Continue current drug regimen. I explained to the patient that she would be on atorvastatin the rest of her life if she tolerates it. She will also be on aspirin. She will be a pedicle for least one year.

## 2017-07-24 NOTE — PHYSICAL EXAM
[General Appearance - Well Developed] : well developed [Normal Appearance] : normal appearance [Well Groomed] : well groomed [General Appearance - Well Nourished] : well nourished [No Deformities] : no deformities [General Appearance - In No Acute Distress] : no acute distress [Normal Conjunctiva] : the conjunctiva exhibited no abnormalities [Eyelids - No Xanthelasma] : the eyelids demonstrated no xanthelasmas [Normal Oral Mucosa] : normal oral mucosa [No Oral Pallor] : no oral pallor [No Oral Cyanosis] : no oral cyanosis [Normal Jugular Venous A Waves Present] : normal jugular venous A waves present [Normal Jugular Venous V Waves Present] : normal jugular venous V waves present [No Jugular Venous Mazariegos A Waves] : no jugular venous mazariegos A waves [Respiration, Rhythm And Depth] : normal respiratory rhythm and effort [Exaggerated Use Of Accessory Muscles For Inspiration] : no accessory muscle use [Auscultation Breath Sounds / Voice Sounds] : lungs were clear to auscultation bilaterally [Heart Rate And Rhythm] : heart rate and rhythm were normal [Heart Sounds] : normal S1 and S2 [Murmurs] : no murmurs present [Arterial Pulses Normal] : the arterial pulses were normal [Edema] : no peripheral edema present [Abdomen Soft] : soft [Abdomen Tenderness] : non-tender [Abdomen Mass (___ Cm)] : no abdominal mass palpated [Abnormal Walk] : normal gait [Gait - Sufficient For Exercise Testing] : the gait was sufficient for exercise testing [Nail Clubbing] : no clubbing of the fingernails [Cyanosis, Localized] : no localized cyanosis [Petechial Hemorrhages (___cm)] : no petechial hemorrhages [Skin Color & Pigmentation] : normal skin color and pigmentation [] : no rash [No Venous Stasis] : no venous stasis [Skin Lesions] : no skin lesions [No Skin Ulcers] : no skin ulcer [No Xanthoma] : no  xanthoma was observed [Oriented To Time, Place, And Person] : oriented to person, place, and time [Affect] : the affect was normal [Mood] : the mood was normal [No Anxiety] : not feeling anxious [FreeTextEntry1] : Tenderness in the right groin. I did not feel any pulsatile masses.

## 2017-08-15 ENCOUNTER — LABORATORY RESULT (OUTPATIENT)
Age: 77
End: 2017-08-15

## 2017-08-15 ENCOUNTER — APPOINTMENT (OUTPATIENT)
Dept: CARDIOLOGY | Facility: CLINIC | Age: 77
End: 2017-08-15
Payer: MEDICARE

## 2017-08-15 VITALS
WEIGHT: 143 LBS | DIASTOLIC BLOOD PRESSURE: 74 MMHG | BODY MASS INDEX: 28.07 KG/M2 | SYSTOLIC BLOOD PRESSURE: 152 MMHG | HEIGHT: 60 IN | OXYGEN SATURATION: 98 % | HEART RATE: 92 BPM

## 2017-08-15 VITALS — SYSTOLIC BLOOD PRESSURE: 132 MMHG | DIASTOLIC BLOOD PRESSURE: 70 MMHG | HEART RATE: 70 BPM

## 2017-08-15 DIAGNOSIS — R06.02 SHORTNESS OF BREATH: ICD-10-CM

## 2017-08-15 PROCEDURE — 99214 OFFICE O/P EST MOD 30 MIN: CPT

## 2017-08-15 PROCEDURE — 36415 COLL VENOUS BLD VENIPUNCTURE: CPT

## 2017-08-17 LAB
ALBUMIN SERPL ELPH-MCNC: 4.6 G/DL
ALP BLD-CCNC: 39 U/L
ALT SERPL-CCNC: 17 U/L
ANION GAP SERPL CALC-SCNC: 22 MMOL/L
AST SERPL-CCNC: 23 U/L
BASOPHILS # BLD AUTO: 0.11 K/UL
BASOPHILS NFR BLD AUTO: 0.9 %
BILIRUB SERPL-MCNC: 0.4 MG/DL
BUN SERPL-MCNC: 20 MG/DL
CALCIUM SERPL-MCNC: 9.9 MG/DL
CHLORIDE SERPL-SCNC: 102 MMOL/L
CHOLEST SERPL-MCNC: 132 MG/DL
CHOLEST/HDLC SERPL: 3.7 RATIO
CO2 SERPL-SCNC: 20 MMOL/L
CREAT SERPL-MCNC: 1 MG/DL
EOSINOPHIL # BLD AUTO: 0.22 K/UL
EOSINOPHIL NFR BLD AUTO: 1.8 %
GLUCOSE SERPL-MCNC: 119 MG/DL
HBA1C MFR BLD HPLC: 5.5 %
HCT VFR BLD CALC: 29.8 %
HDLC SERPL-MCNC: 36 MG/DL
HGB BLD-MCNC: 9.1 G/DL
LDLC SERPL CALC-MCNC: 37 MG/DL
LYMPHOCYTES # BLD AUTO: 6.51 K/UL
LYMPHOCYTES NFR BLD AUTO: 54.1 %
MAN DIFF?: NORMAL
MCHC RBC-ENTMCNC: 19.6 PG
MCHC RBC-ENTMCNC: 30.5 GM/DL
MCV RBC AUTO: 64.2 FL
MONOCYTES # BLD AUTO: 0.11 K/UL
MONOCYTES NFR BLD AUTO: 0.9 %
NEUTROPHILS # BLD AUTO: 4.98 K/UL
NEUTROPHILS NFR BLD AUTO: 41.4 %
NT-PROBNP SERPL-MCNC: 1279 PG/ML
PLATELET # BLD AUTO: 198 K/UL
POTASSIUM SERPL-SCNC: 5 MMOL/L
PROT SERPL-MCNC: 7 G/DL
RBC # BLD: 4.64 M/UL
RBC # FLD: 17.1 %
SODIUM SERPL-SCNC: 144 MMOL/L
TRIGL SERPL-MCNC: 296 MG/DL
WBC # FLD AUTO: 12.03 K/UL

## 2017-10-18 ENCOUNTER — APPOINTMENT (OUTPATIENT)
Dept: CARDIOLOGY | Facility: CLINIC | Age: 77
End: 2017-10-18

## 2017-10-18 ENCOUNTER — NON-APPOINTMENT (OUTPATIENT)
Age: 77
End: 2017-10-18

## 2017-10-18 ENCOUNTER — APPOINTMENT (OUTPATIENT)
Dept: CARDIOLOGY | Facility: CLINIC | Age: 77
End: 2017-10-18
Payer: MEDICARE

## 2017-10-18 VITALS — SYSTOLIC BLOOD PRESSURE: 176 MMHG | DIASTOLIC BLOOD PRESSURE: 78 MMHG | HEART RATE: 87 BPM | OXYGEN SATURATION: 98 %

## 2017-10-18 DIAGNOSIS — Z95.5 PRESENCE OF CORONARY ANGIOPLASTY IMPLANT AND GRAFT: ICD-10-CM

## 2017-10-18 PROCEDURE — 93000 ELECTROCARDIOGRAM COMPLETE: CPT

## 2017-10-18 PROCEDURE — 99214 OFFICE O/P EST MOD 30 MIN: CPT

## 2017-11-06 PROBLEM — Z95.5 S/P CORONARY ARTERY STENT PLACEMENT: Status: ACTIVE | Noted: 2017-07-24

## 2017-11-07 ENCOUNTER — RX RENEWAL (OUTPATIENT)
Age: 77
End: 2017-11-07

## 2018-01-08 ENCOUNTER — RX RENEWAL (OUTPATIENT)
Age: 78
End: 2018-01-08

## 2018-02-05 ENCOUNTER — RX RENEWAL (OUTPATIENT)
Age: 78
End: 2018-02-05

## 2018-02-11 ENCOUNTER — RX RENEWAL (OUTPATIENT)
Age: 78
End: 2018-02-11

## 2018-02-11 RX ORDER — ATORVASTATIN CALCIUM 20 MG/1
20 TABLET, FILM COATED ORAL
Qty: 90 | Refills: 0 | Status: ACTIVE | COMMUNITY
Start: 2017-07-18 | End: 1900-01-01

## 2018-05-23 ENCOUNTER — NON-APPOINTMENT (OUTPATIENT)
Age: 78
End: 2018-05-23

## 2018-05-23 ENCOUNTER — APPOINTMENT (OUTPATIENT)
Dept: CARDIOLOGY | Facility: CLINIC | Age: 78
End: 2018-05-23
Payer: MEDICARE

## 2018-05-23 ENCOUNTER — LABORATORY RESULT (OUTPATIENT)
Age: 78
End: 2018-05-23

## 2018-05-23 VITALS
HEIGHT: 60 IN | HEART RATE: 87 BPM | WEIGHT: 148.6 LBS | OXYGEN SATURATION: 97 % | SYSTOLIC BLOOD PRESSURE: 157 MMHG | BODY MASS INDEX: 29.17 KG/M2 | DIASTOLIC BLOOD PRESSURE: 81 MMHG

## 2018-05-23 VITALS — SYSTOLIC BLOOD PRESSURE: 140 MMHG | HEART RATE: 78 BPM | DIASTOLIC BLOOD PRESSURE: 78 MMHG

## 2018-05-23 DIAGNOSIS — K21.0 GASTRO-ESOPHAGEAL REFLUX DISEASE WITH ESOPHAGITIS: ICD-10-CM

## 2018-05-23 PROCEDURE — 93000 ELECTROCARDIOGRAM COMPLETE: CPT

## 2018-05-23 PROCEDURE — 99214 OFFICE O/P EST MOD 30 MIN: CPT

## 2018-05-23 PROCEDURE — 36415 COLL VENOUS BLD VENIPUNCTURE: CPT

## 2018-05-23 RX ORDER — CLOPIDOGREL BISULFATE 75 MG/1
75 TABLET, FILM COATED ORAL
Qty: 90 | Refills: 0 | Status: DISCONTINUED | COMMUNITY
Start: 2017-07-18 | End: 2018-05-23

## 2018-05-24 LAB
ALBUMIN SERPL ELPH-MCNC: 4.4 G/DL
ALP BLD-CCNC: 43 U/L
ALT SERPL-CCNC: 17 U/L
ANION GAP SERPL CALC-SCNC: 19 MMOL/L
AST SERPL-CCNC: 20 U/L
BASOPHILS # BLD AUTO: 0.04 K/UL
BASOPHILS NFR BLD AUTO: 0.3 %
BILIRUB SERPL-MCNC: 0.5 MG/DL
BUN SERPL-MCNC: 15 MG/DL
CALCIUM SERPL-MCNC: 9.8 MG/DL
CHLORIDE SERPL-SCNC: 101 MMOL/L
CHOLEST SERPL-MCNC: 135 MG/DL
CHOLEST/HDLC SERPL: 3.4 RATIO
CO2 SERPL-SCNC: 24 MMOL/L
CREAT SERPL-MCNC: 0.93 MG/DL
EOSINOPHIL # BLD AUTO: 0.19 K/UL
EOSINOPHIL NFR BLD AUTO: 1.2 %
GLUCOSE SERPL-MCNC: 96 MG/DL
HBA1C MFR BLD HPLC: 5.7 %
HCT VFR BLD CALC: 30.6 %
HDLC SERPL-MCNC: 40 MG/DL
HGB BLD-MCNC: 8.9 G/DL
IMM GRANULOCYTES NFR BLD AUTO: 0.1 %
LDLC SERPL CALC-MCNC: 29 MG/DL
LYMPHOCYTES # BLD AUTO: 11.12 K/UL
LYMPHOCYTES NFR BLD AUTO: 71.5 %
MAN DIFF?: NORMAL
MCHC RBC-ENTMCNC: 19.5 PG
MCHC RBC-ENTMCNC: 29.1 GM/DL
MCV RBC AUTO: 67.1 FL
MONOCYTES # BLD AUTO: 0.44 K/UL
MONOCYTES NFR BLD AUTO: 2.8 %
NEUTROPHILS # BLD AUTO: 3.74 K/UL
NEUTROPHILS NFR BLD AUTO: 24.1 %
NT-PROBNP SERPL-MCNC: 387 PG/ML
PLATELET # BLD AUTO: 184 K/UL
POTASSIUM SERPL-SCNC: 4.2 MMOL/L
PROT SERPL-MCNC: 6.8 G/DL
RBC # BLD: 4.56 M/UL
RBC # FLD: 17.6 %
SODIUM SERPL-SCNC: 144 MMOL/L
TRIGL SERPL-MCNC: 329 MG/DL
TSH SERPL-ACNC: 3.28 UIU/ML
WBC # FLD AUTO: 15.55 K/UL

## 2018-08-06 ENCOUNTER — RECORD ABSTRACTING (OUTPATIENT)
Age: 78
End: 2018-08-06

## 2018-08-07 ENCOUNTER — RX RENEWAL (OUTPATIENT)
Age: 78
End: 2018-08-07

## 2018-08-13 ENCOUNTER — LABORATORY RESULT (OUTPATIENT)
Age: 78
End: 2018-08-13

## 2018-08-13 ENCOUNTER — APPOINTMENT (OUTPATIENT)
Dept: CARDIOLOGY | Facility: CLINIC | Age: 78
End: 2018-08-13
Payer: MEDICARE

## 2018-08-13 VITALS
TEMPERATURE: 98 F | BODY MASS INDEX: 28.66 KG/M2 | HEART RATE: 94 BPM | OXYGEN SATURATION: 98 % | SYSTOLIC BLOOD PRESSURE: 178 MMHG | DIASTOLIC BLOOD PRESSURE: 79 MMHG | HEIGHT: 60 IN | WEIGHT: 146 LBS

## 2018-08-13 VITALS — SYSTOLIC BLOOD PRESSURE: 160 MMHG | HEART RATE: 88 BPM | DIASTOLIC BLOOD PRESSURE: 80 MMHG

## 2018-08-13 DIAGNOSIS — R60.0 LOCALIZED EDEMA: ICD-10-CM

## 2018-08-13 PROCEDURE — 36415 COLL VENOUS BLD VENIPUNCTURE: CPT

## 2018-08-13 PROCEDURE — 93320 DOPPLER ECHO COMPLETE: CPT

## 2018-08-13 PROCEDURE — 93351 STRESS TTE COMPLETE: CPT

## 2018-08-13 PROCEDURE — 93325 DOPPLER ECHO COLOR FLOW MAPG: CPT

## 2018-08-13 PROCEDURE — 99214 OFFICE O/P EST MOD 30 MIN: CPT | Mod: 25

## 2018-08-15 LAB
ALBUMIN SERPL ELPH-MCNC: 4.3 G/DL
ALP BLD-CCNC: 50 U/L
ALT SERPL-CCNC: 17 U/L
ANION GAP SERPL CALC-SCNC: 16 MMOL/L
AST SERPL-CCNC: 19 U/L
BASOPHILS # BLD AUTO: 0 K/UL
BASOPHILS NFR BLD AUTO: 0 %
BILIRUB SERPL-MCNC: 0.5 MG/DL
BUN SERPL-MCNC: 19 MG/DL
CALCIUM SERPL-MCNC: 9.7 MG/DL
CHLORIDE SERPL-SCNC: 104 MMOL/L
CHOLEST SERPL-MCNC: 131 MG/DL
CHOLEST/HDLC SERPL: 3.2 RATIO
CO2 SERPL-SCNC: 23 MMOL/L
CREAT SERPL-MCNC: 0.76 MG/DL
EOSINOPHIL # BLD AUTO: 0.29 K/UL
EOSINOPHIL NFR BLD AUTO: 2 %
GLUCOSE SERPL-MCNC: 134 MG/DL
HBA1C MFR BLD HPLC: 5.8 %
HCT VFR BLD CALC: 32.3 %
HDLC SERPL-MCNC: 41 MG/DL
HGB BLD-MCNC: 9.7 G/DL
LDLC SERPL CALC-MCNC: NORMAL
LYMPHOCYTES # BLD AUTO: 9.05 K/UL
LYMPHOCYTES NFR BLD AUTO: 63 %
MAN DIFF?: NORMAL
MCHC RBC-ENTMCNC: 20.1 PG
MCHC RBC-ENTMCNC: 30 GM/DL
MCV RBC AUTO: 67 FL
MONOCYTES # BLD AUTO: 0.43 K/UL
MONOCYTES NFR BLD AUTO: 3 %
NEUTROPHILS # BLD AUTO: 4.31 K/UL
NEUTROPHILS NFR BLD AUTO: 30 %
NT-PROBNP SERPL-MCNC: 375 PG/ML
PLATELET # BLD AUTO: 162 K/UL
POTASSIUM SERPL-SCNC: 5.1 MMOL/L
PROT SERPL-MCNC: 6.4 G/DL
RBC # BLD: 4.82 M/UL
RBC # FLD: 17 %
SODIUM SERPL-SCNC: 143 MMOL/L
TRIGL SERPL-MCNC: 419 MG/DL
WBC # FLD AUTO: 14.36 K/UL

## 2018-09-03 PROBLEM — R60.0 BILATERAL EDEMA OF LOWER EXTREMITY: Status: ACTIVE | Noted: 2018-08-13

## 2019-01-28 ENCOUNTER — RX RENEWAL (OUTPATIENT)
Age: 79
End: 2019-01-28

## 2019-02-07 ENCOUNTER — MEDICATION RENEWAL (OUTPATIENT)
Age: 79
End: 2019-02-07

## 2019-02-26 ENCOUNTER — APPOINTMENT (OUTPATIENT)
Dept: CARDIOLOGY | Facility: CLINIC | Age: 79
End: 2019-02-26
Payer: MEDICARE

## 2019-02-26 ENCOUNTER — LABORATORY RESULT (OUTPATIENT)
Age: 79
End: 2019-02-26

## 2019-02-26 ENCOUNTER — NON-APPOINTMENT (OUTPATIENT)
Age: 79
End: 2019-02-26

## 2019-02-26 VITALS — HEART RATE: 78 BPM | DIASTOLIC BLOOD PRESSURE: 85 MMHG | SYSTOLIC BLOOD PRESSURE: 160 MMHG

## 2019-02-26 VITALS
SYSTOLIC BLOOD PRESSURE: 181 MMHG | DIASTOLIC BLOOD PRESSURE: 73 MMHG | OXYGEN SATURATION: 99 % | HEART RATE: 80 BPM | BODY MASS INDEX: 28.71 KG/M2 | WEIGHT: 147 LBS

## 2019-02-26 PROCEDURE — 99214 OFFICE O/P EST MOD 30 MIN: CPT

## 2019-02-26 PROCEDURE — 93000 ELECTROCARDIOGRAM COMPLETE: CPT

## 2019-02-26 PROCEDURE — 36415 COLL VENOUS BLD VENIPUNCTURE: CPT

## 2019-02-26 NOTE — PHYSICAL EXAM
[General Appearance - Well Developed] : well developed [Normal Appearance] : normal appearance [Well Groomed] : well groomed [General Appearance - Well Nourished] : well nourished [No Deformities] : no deformities [General Appearance - In No Acute Distress] : no acute distress [Normal Conjunctiva] : the conjunctiva exhibited no abnormalities [Eyelids - No Xanthelasma] : the eyelids demonstrated no xanthelasmas [Normal Oral Mucosa] : normal oral mucosa [No Oral Pallor] : no oral pallor [No Oral Cyanosis] : no oral cyanosis [Normal Jugular Venous A Waves Present] : normal jugular venous A waves present [Normal Jugular Venous V Waves Present] : normal jugular venous V waves present [No Jugular Venous Mazariegos A Waves] : no jugular venous mazariegos A waves [Respiration, Rhythm And Depth] : normal respiratory rhythm and effort [Exaggerated Use Of Accessory Muscles For Inspiration] : no accessory muscle use [Auscultation Breath Sounds / Voice Sounds] : lungs were clear to auscultation bilaterally [Heart Rate And Rhythm] : heart rate and rhythm were normal [Heart Sounds] : normal S1 and S2 [Murmurs] : no murmurs present [Arterial Pulses Normal] : the arterial pulses were normal [Edema] : no peripheral edema present [Abdomen Soft] : soft [Abdomen Tenderness] : non-tender [Abdomen Mass (___ Cm)] : no abdominal mass palpated [Abnormal Walk] : normal gait [Gait - Sufficient For Exercise Testing] : the gait was sufficient for exercise testing [Nail Clubbing] : no clubbing of the fingernails [Cyanosis, Localized] : no localized cyanosis [Petechial Hemorrhages (___cm)] : no petechial hemorrhages [Skin Color & Pigmentation] : normal skin color and pigmentation [] : no rash [No Venous Stasis] : no venous stasis [Skin Lesions] : no skin lesions [No Skin Ulcers] : no skin ulcer [No Xanthoma] : no  xanthoma was observed [Oriented To Time, Place, And Person] : oriented to person, place, and time [Affect] : the affect was normal [Mood] : the mood was normal [No Anxiety] : not feeling anxious [FreeTextEntry1] : Good spirits.

## 2019-02-26 NOTE — HISTORY OF PRESENT ILLNESS
[FreeTextEntry1] : The patient was seen on July 18, 2017 with complaints of chest pain and abnormal EKG. She was sent to the hospital where she had a cardiac catheterization. This led to stenting of a severe right coronary artery lesions. The patient has had no further chest pain. She has had no further arm pain.\par \par August 15, 2017. Patient is here in followup. She feels well since the stent. We reviewed all her medications. She still has a little swelling in the groin area from her angiogram, but it is not getting bigger or spreading.\par May 23, 2018. The patient here in followup. She stopped her Plavix in October after seeing Dr. Green. Gets occasional chest pain down the middle of her sternum to her abdomen when she lies down at night, but no exertional symptoms.\par August 13, 2018. The patient returns in followup and for stress echocardiogram. Stress echo showed no ischemia, normal LV function with just minimal MR, mild AI, some features of possible HOCM, but no significant outflow tract gradient. Blood pressure was a little high, as was heart rate, but she had held her beta blocker for the stress test. She was concerned about very mild pedal edema that has developed. I explained to her and her daughter about venous insufficiency and reassured them. Consider support stockings if necessary.\par February 26, 2019. Patient returns in followup. EKG mostly normal with some APCs and nonspecific ST changes with minimal T-wave inversions V3 through 6. She has no complaints but the daughter was concerned that her blood pressure was consistently high, even at Dr. Trevor Tang, where it is borderline to high. We reviewed all her medication and I would first increase her metoprolol given her hypertrophic features nd K+ 5.1.

## 2019-02-26 NOTE — REASON FOR VISIT
[FreeTextEntry1] : The patient is a 78-year-old woman with diabetes mellitus and atherosclerotic heart disease with an acute stent to her RCA in July, 2017, who is here for followup after atypical chest pain

## 2019-02-26 NOTE — REVIEW OF SYSTEMS
[see HPI] : see HPI [Negative] : Heme/Lymph [Dyspnea on exertion] : not dyspnea during exertion [Chest  Pressure] : no chest pressure [Chest Pain] : no chest pain [Lower Ext Edema] : no extremity edema [Dizziness] : no dizziness

## 2019-02-27 LAB
ALBUMIN SERPL ELPH-MCNC: 4.5 G/DL
ALP BLD-CCNC: 49 U/L
ALT SERPL-CCNC: 21 U/L
ANION GAP SERPL CALC-SCNC: 13 MMOL/L
AST SERPL-CCNC: 22 U/L
BASOPHILS # BLD AUTO: 0 K/UL
BASOPHILS NFR BLD AUTO: 0 %
BILIRUB SERPL-MCNC: 0.4 MG/DL
BUN SERPL-MCNC: 14 MG/DL
CALCIUM SERPL-MCNC: 10.3 MG/DL
CHLORIDE SERPL-SCNC: 104 MMOL/L
CHOLEST SERPL-MCNC: 131 MG/DL
CHOLEST/HDLC SERPL: 3.3 RATIO
CO2 SERPL-SCNC: 27 MMOL/L
CREAT SERPL-MCNC: 0.93 MG/DL
EOSINOPHIL # BLD AUTO: 0.29 K/UL
EOSINOPHIL NFR BLD AUTO: 2 %
GLUCOSE SERPL-MCNC: 115 MG/DL
HBA1C MFR BLD HPLC: 6.1 %
HCT VFR BLD CALC: 33.8 %
HDLC SERPL-MCNC: 40 MG/DL
HGB BLD-MCNC: 9.8 G/DL
LDLC SERPL CALC-MCNC: 19 MG/DL
LYMPHOCYTES # BLD AUTO: 10.3 K/UL
LYMPHOCYTES NFR BLD AUTO: 70 %
MAN DIFF?: NORMAL
MCHC RBC-ENTMCNC: 19.5 PG
MCHC RBC-ENTMCNC: 29 GM/DL
MCV RBC AUTO: 67.3 FL
MONOCYTES # BLD AUTO: 0.59 K/UL
MONOCYTES NFR BLD AUTO: 4 %
NEUTROPHILS # BLD AUTO: 3.53 K/UL
NEUTROPHILS NFR BLD AUTO: 24 %
NT-PROBNP SERPL-MCNC: 274 PG/ML
PLATELET # BLD AUTO: 186 K/UL
POTASSIUM SERPL-SCNC: 4.7 MMOL/L
PROT SERPL-MCNC: 7.1 G/DL
RBC # BLD: 5.02 M/UL
RBC # FLD: 16.3 %
SODIUM SERPL-SCNC: 144 MMOL/L
TRIGL SERPL-MCNC: 361 MG/DL
TSH SERPL-ACNC: 3.15 UIU/ML
WBC # FLD AUTO: 14.71 K/UL

## 2019-03-02 ENCOUNTER — MEDICATION RENEWAL (OUTPATIENT)
Age: 79
End: 2019-03-02

## 2019-05-24 ENCOUNTER — RX RENEWAL (OUTPATIENT)
Age: 79
End: 2019-05-24

## 2019-06-26 ENCOUNTER — NON-APPOINTMENT (OUTPATIENT)
Age: 79
End: 2019-06-26

## 2019-06-26 ENCOUNTER — APPOINTMENT (OUTPATIENT)
Dept: CARDIOLOGY | Facility: CLINIC | Age: 79
End: 2019-06-26
Payer: MEDICARE

## 2019-06-26 ENCOUNTER — LABORATORY RESULT (OUTPATIENT)
Age: 79
End: 2019-06-26

## 2019-06-26 VITALS
WEIGHT: 144 LBS | HEART RATE: 70 BPM | SYSTOLIC BLOOD PRESSURE: 153 MMHG | OXYGEN SATURATION: 95 % | BODY MASS INDEX: 28.27 KG/M2 | HEIGHT: 60 IN | DIASTOLIC BLOOD PRESSURE: 84 MMHG

## 2019-06-26 PROCEDURE — 93000 ELECTROCARDIOGRAM COMPLETE: CPT

## 2019-06-26 PROCEDURE — 36415 COLL VENOUS BLD VENIPUNCTURE: CPT

## 2019-06-26 PROCEDURE — 99214 OFFICE O/P EST MOD 30 MIN: CPT

## 2019-06-26 NOTE — REASON FOR VISIT
[FreeTextEntry1] : The patient is a 79-year-old woman with diabetes mellitus and atherosclerotic heart disease with an acute stent to her RCA in July, 2017, who is here for followup

## 2019-06-26 NOTE — REVIEW OF SYSTEMS
[see HPI] : see HPI [Negative] : Heme/Lymph [Recent Weight Gain (___ Lbs)] : recent [unfilled] ~Ulb weight gain [Dyspnea on exertion] : not dyspnea during exertion [Chest  Pressure] : no chest pressure [Chest Pain] : no chest pain [Lower Ext Edema] : no extremity edema [Dizziness] : no dizziness

## 2019-06-26 NOTE — HISTORY OF PRESENT ILLNESS
[FreeTextEntry1] : The patient was seen on July 18, 2017 with complaints of chest pain and abnormal EKG. She was sent to the hospital where she had a cardiac catheterization. This led to stenting of a severe right coronary artery lesions. The patient has had no further chest pain. She has had no further arm pain.\par \par August 15, 2017. Patient is here in followup. She feels well since the stent. We reviewed all her medications. She still has a little swelling in the groin area from her angiogram, but it is not getting bigger or spreading.\par May 23, 2018. The patient here in followup. She stopped her Plavix in October after seeing Dr. Green. Gets occasional chest pain down the middle of her sternum to her abdomen when she lies down at night, but no exertional symptoms.\par August 13, 2018. The patient returns in followup and for stress echocardiogram. Stress echo showed no ischemia, normal LV function with just minimal MR, mild AI, some features of possible HOCM, but no significant outflow tract gradient. Blood pressure was a little high, as was heart rate, but she had held her beta blocker for the stress test. She was concerned about very mild pedal edema that has developed. I explained to her and her daughter about venous insufficiency and reassured them. Consider support stockings if necessary.\par February 26, 2019. Patient returns in followup. EKG mostly normal with some APCs and nonspecific ST changes with minimal T-wave inversions V3 through 6. She has no complaints but the daughter was concerned that her blood pressure was consistently high, even at Dr. Trevor Tang, where it is borderline to high. We reviewed all her medication and I would first increase her metoprolol given her hypertrophic features and K+ 5.1.\par June 26, 2019. Patient returns in followup. She remains in sinus rhythm at 71 with minimally flattened to inverted T waves III and aVF, and tiny, inverted T waves V3 through 6. She is doing very well, working in the garden all the time, doing a lot of walking, etc., without any symptoms or complaints. Her blood pressure was elevated here again but at her cancer doctor it was totally normal.

## 2019-06-27 LAB
ALBUMIN SERPL ELPH-MCNC: 4.4 G/DL
ALP BLD-CCNC: 37 U/L
ALT SERPL-CCNC: 16 U/L
ANION GAP SERPL CALC-SCNC: 11 MMOL/L
AST SERPL-CCNC: 16 U/L
BILIRUB SERPL-MCNC: 0.5 MG/DL
BUN SERPL-MCNC: 16 MG/DL
CALCIUM SERPL-MCNC: 9.9 MG/DL
CHLORIDE SERPL-SCNC: 107 MMOL/L
CHOLEST SERPL-MCNC: 127 MG/DL
CHOLEST/HDLC SERPL: 3.4 RATIO
CO2 SERPL-SCNC: 25 MMOL/L
CREAT SERPL-MCNC: 1.02 MG/DL
ESTIMATED AVERAGE GLUCOSE: 117 MG/DL
GLUCOSE SERPL-MCNC: 137 MG/DL
HBA1C MFR BLD HPLC: 5.7 %
HDLC SERPL-MCNC: 37 MG/DL
LDLC SERPL CALC-MCNC: 14 MG/DL
NT-PROBNP SERPL-MCNC: 605 PG/ML
POTASSIUM SERPL-SCNC: 4.8 MMOL/L
PROT SERPL-MCNC: 6.4 G/DL
SODIUM SERPL-SCNC: 143 MMOL/L
TRIGL SERPL-MCNC: 382 MG/DL

## 2019-06-28 LAB
BASOPHILS # BLD AUTO: 0.15 K/UL
BASOPHILS NFR BLD AUTO: 1 %
EOSINOPHIL # BLD AUTO: 0.29 K/UL
EOSINOPHIL NFR BLD AUTO: 2 %
HCT VFR BLD CALC: 33.3 %
HGB BLD-MCNC: 9.6 G/DL
LYMPHOCYTES # BLD AUTO: 9.31 K/UL
LYMPHOCYTES NFR BLD AUTO: 64 %
MAN DIFF?: NORMAL
MCHC RBC-ENTMCNC: 19.6 PG
MCHC RBC-ENTMCNC: 28.8 GM/DL
MCV RBC AUTO: 67.8 FL
MONOCYTES # BLD AUTO: 2.76 K/UL
MONOCYTES NFR BLD AUTO: 19 %
NEUTROPHILS # BLD AUTO: 1.89 K/UL
NEUTROPHILS NFR BLD AUTO: 13 %
PLATELET # BLD AUTO: 177 K/UL
RBC # BLD: 4.91 M/UL
RBC # FLD: 16.5 %
WBC # FLD AUTO: 14.54 K/UL

## 2019-07-03 ENCOUNTER — RX RENEWAL (OUTPATIENT)
Age: 79
End: 2019-07-03

## 2019-10-22 ENCOUNTER — LABORATORY RESULT (OUTPATIENT)
Age: 79
End: 2019-10-22

## 2019-10-23 ENCOUNTER — NON-APPOINTMENT (OUTPATIENT)
Age: 79
End: 2019-10-23

## 2019-10-23 ENCOUNTER — APPOINTMENT (OUTPATIENT)
Dept: CARDIOLOGY | Facility: CLINIC | Age: 79
End: 2019-10-23
Payer: MEDICARE

## 2019-10-23 VITALS
SYSTOLIC BLOOD PRESSURE: 175 MMHG | DIASTOLIC BLOOD PRESSURE: 74 MMHG | OXYGEN SATURATION: 98 % | HEIGHT: 60 IN | WEIGHT: 146 LBS | BODY MASS INDEX: 28.66 KG/M2 | TEMPERATURE: 98.6 F | HEART RATE: 79 BPM

## 2019-10-23 VITALS — DIASTOLIC BLOOD PRESSURE: 80 MMHG | HEART RATE: 76 BPM | SYSTOLIC BLOOD PRESSURE: 160 MMHG

## 2019-10-23 PROCEDURE — 93000 ELECTROCARDIOGRAM COMPLETE: CPT

## 2019-10-23 PROCEDURE — 99214 OFFICE O/P EST MOD 30 MIN: CPT

## 2019-10-23 PROCEDURE — 36415 COLL VENOUS BLD VENIPUNCTURE: CPT

## 2019-10-23 NOTE — REVIEW OF SYSTEMS
[see HPI] : see HPI [Recent Weight Gain (___ Lbs)] : no recent weight gain [Dyspnea on exertion] : not dyspnea during exertion [Chest  Pressure] : no chest pressure [Chest Pain] : no chest pain [Dizziness] : no dizziness [Lower Ext Edema] : no extremity edema [Negative] : Heme/Lymph

## 2019-10-23 NOTE — HISTORY OF PRESENT ILLNESS
[FreeTextEntry1] : The patient was seen on July 18, 2017 with complaints of chest pain and abnormal EKG. She was sent to the hospital where she had a cardiac catheterization. This led to stenting of a severe right coronary artery lesions. The patient has had no further chest pain. She has had no further arm pain.\par \par August 15, 2017. Patient is here in followup. She feels well since the stent. We reviewed all her medications. She still has a little swelling in the groin area from her angiogram, but it is not getting bigger or spreading.\par May 23, 2018. The patient here in followup. She stopped her Plavix in October after seeing Dr. Green. Gets occasional chest pain down the middle of her sternum to her abdomen when she lies down at night, but no exertional symptoms.\par August 13, 2018. The patient returns in followup and for stress echocardiogram. Stress echo showed no ischemia, normal LV function with just minimal MR, mild AI, some features of possible HOCM, but no significant outflow tract gradient. Blood pressure was a little high, as was heart rate, but she had held her beta blocker for the stress test. She was concerned about very mild pedal edema that has developed. I explained to her and her daughter about venous insufficiency and reassured them. Consider support stockings if necessary.\par February 26, 2019. Patient returns in followup. EKG mostly normal with some APCs and nonspecific ST changes with minimal T-wave inversions V3 through 6. She has no complaints but the daughter was concerned that her blood pressure was consistently high, even at Dr. Trevor Tang, where it is borderline to high. We reviewed all her medication and I would first increase her metoprolol given her hypertrophic features and K+ 5.1.\par June 26, 2019. Patient returns in followup. She remains in sinus rhythm at 71 with minimally flattened to inverted T waves III and aVF, and tiny, inverted T waves V3 through 6. She is doing very well, working in the garden all the time, doing a lot of walking, etc., without any symptoms or complaints. Her blood pressure was elevated here again but at her cancer doctor it was totally normal.\par October 23, 2019.  Patient returns in follow-up.  Remains in sinus rhythm at 71 with some diffuse flattened or inverted T waves.  Walking every day without symptoms.  Here with her daughter and they had an upsetting conversation on the way here and her blood pressure is somewhat high but did come down by the end of the exam.  Her blood pressure at Dr. Trevor Tang was normal according to the patient and daughter.  No other complaints

## 2019-10-23 NOTE — PHYSICAL EXAM
[General Appearance - Well Developed] : well developed [Normal Appearance] : normal appearance [Well Groomed] : well groomed [General Appearance - In No Acute Distress] : no acute distress [No Deformities] : no deformities [General Appearance - Well Nourished] : well nourished [Normal Conjunctiva] : the conjunctiva exhibited no abnormalities [Eyelids - No Xanthelasma] : the eyelids demonstrated no xanthelasmas [Normal Oral Mucosa] : normal oral mucosa [No Oral Pallor] : no oral pallor [No Oral Cyanosis] : no oral cyanosis [Normal Jugular Venous A Waves Present] : normal jugular venous A waves present [Normal Jugular Venous V Waves Present] : normal jugular venous V waves present [No Jugular Venous Mazariegos A Waves] : no jugular venous mazariegos A waves [Respiration, Rhythm And Depth] : normal respiratory rhythm and effort [Exaggerated Use Of Accessory Muscles For Inspiration] : no accessory muscle use [Auscultation Breath Sounds / Voice Sounds] : lungs were clear to auscultation bilaterally [Heart Sounds] : normal S1 and S2 [Murmurs] : no murmurs present [Heart Rate And Rhythm] : heart rate and rhythm were normal [Arterial Pulses Normal] : the arterial pulses were normal [Abdomen Soft] : soft [Edema] : no peripheral edema present [Abdomen Tenderness] : non-tender [Abdomen Mass (___ Cm)] : no abdominal mass palpated [Abnormal Walk] : normal gait [Nail Clubbing] : no clubbing of the fingernails [Gait - Sufficient For Exercise Testing] : the gait was sufficient for exercise testing [Petechial Hemorrhages (___cm)] : no petechial hemorrhages [Cyanosis, Localized] : no localized cyanosis [Skin Color & Pigmentation] : normal skin color and pigmentation [] : no rash [No Venous Stasis] : no venous stasis [No Skin Ulcers] : no skin ulcer [Skin Lesions] : no skin lesions [No Xanthoma] : no  xanthoma was observed [Affect] : the affect was normal [Oriented To Time, Place, And Person] : oriented to person, place, and time [Mood] : the mood was normal [No Anxiety] : not feeling anxious [FreeTextEntry1] : Good spirits.

## 2019-10-25 LAB
ALBUMIN SERPL ELPH-MCNC: 4.6 G/DL
ALP BLD-CCNC: 47 U/L
ALT SERPL-CCNC: 16 U/L
ANION GAP SERPL CALC-SCNC: 12 MMOL/L
AST SERPL-CCNC: 18 U/L
BASOPHILS # BLD AUTO: 0.09 K/UL
BASOPHILS NFR BLD AUTO: 0.5 %
BILIRUB SERPL-MCNC: 0.4 MG/DL
BUN SERPL-MCNC: 20 MG/DL
CALCIUM SERPL-MCNC: 10.2 MG/DL
CHLORIDE SERPL-SCNC: 106 MMOL/L
CHOLEST SERPL-MCNC: 156 MG/DL
CHOLEST/HDLC SERPL: 3.7 RATIO
CO2 SERPL-SCNC: 24 MMOL/L
CREAT SERPL-MCNC: 0.97 MG/DL
EOSINOPHIL # BLD AUTO: 0.19 K/UL
EOSINOPHIL NFR BLD AUTO: 1.1 %
ESTIMATED AVERAGE GLUCOSE: 120 MG/DL
GLUCOSE SERPL-MCNC: 153 MG/DL
HBA1C MFR BLD HPLC: 5.8 %
HCT VFR BLD CALC: 32.5 %
HDLC SERPL-MCNC: 42 MG/DL
HGB BLD-MCNC: 9.4 G/DL
IMM GRANULOCYTES NFR BLD AUTO: 0.1 %
LDLC SERPL CALC-MCNC: NORMAL MG/DL
LYMPHOCYTES # BLD AUTO: 12.17 K/UL
LYMPHOCYTES NFR BLD AUTO: 72.1 %
MAN DIFF?: NORMAL
MCHC RBC-ENTMCNC: 19.3 PG
MCHC RBC-ENTMCNC: 28.9 GM/DL
MCV RBC AUTO: 66.9 FL
MONOCYTES # BLD AUTO: 0.53 K/UL
MONOCYTES NFR BLD AUTO: 3.1 %
NEUTROPHILS # BLD AUTO: 3.87 K/UL
NEUTROPHILS NFR BLD AUTO: 23.1 %
NT-PROBNP SERPL-MCNC: 432 PG/ML
PLATELET # BLD AUTO: 183 K/UL
POTASSIUM SERPL-SCNC: 4.8 MMOL/L
PROT SERPL-MCNC: 6.5 G/DL
RBC # BLD: 4.86 M/UL
RBC # FLD: 16.3 %
SODIUM SERPL-SCNC: 142 MMOL/L
TRIGL SERPL-MCNC: 419 MG/DL
WBC # FLD AUTO: 16.87 K/UL

## 2020-04-02 NOTE — H&P CARDIOLOGY - CENTRAL VENOUS CATHETER
Dr burgess her to see pt; pt is sleepy but converses; falls asleep while trying to eat burrito; staring at times; dr aware that levo back on aftyer 500 ml bolus failed to raise bp adequately; will do abg due to lethragy/obtunded; pt disoriented to place and time at this time   no

## 2020-04-29 ENCOUNTER — APPOINTMENT (OUTPATIENT)
Dept: CARDIOLOGY | Facility: CLINIC | Age: 80
End: 2020-04-29

## 2020-06-25 ENCOUNTER — APPOINTMENT (OUTPATIENT)
Dept: CARDIOLOGY | Facility: CLINIC | Age: 80
End: 2020-06-25
Payer: MEDICARE

## 2020-06-25 ENCOUNTER — LABORATORY RESULT (OUTPATIENT)
Age: 80
End: 2020-06-25

## 2020-06-25 ENCOUNTER — NON-APPOINTMENT (OUTPATIENT)
Age: 80
End: 2020-06-25

## 2020-06-25 VITALS
HEART RATE: 73 BPM | TEMPERATURE: 97.71 F | HEIGHT: 60 IN | BODY MASS INDEX: 28.66 KG/M2 | OXYGEN SATURATION: 100 % | SYSTOLIC BLOOD PRESSURE: 156 MMHG | DIASTOLIC BLOOD PRESSURE: 68 MMHG | WEIGHT: 146 LBS

## 2020-06-25 VITALS — HEART RATE: 70 BPM | DIASTOLIC BLOOD PRESSURE: 78 MMHG | SYSTOLIC BLOOD PRESSURE: 152 MMHG

## 2020-06-25 PROCEDURE — 93000 ELECTROCARDIOGRAM COMPLETE: CPT

## 2020-06-25 PROCEDURE — 99214 OFFICE O/P EST MOD 30 MIN: CPT

## 2020-06-25 PROCEDURE — 36415 COLL VENOUS BLD VENIPUNCTURE: CPT

## 2020-06-25 RX ORDER — TELMISARTAN 80 MG/1
80 TABLET ORAL DAILY
Refills: 0 | Status: ACTIVE | COMMUNITY
Start: 2020-06-25

## 2020-06-25 RX ORDER — IRBESARTAN 150 MG/1
150 TABLET ORAL DAILY
Qty: 90 | Refills: 1 | Status: DISCONTINUED | COMMUNITY
Start: 2018-01-02 | End: 2020-06-25

## 2020-06-25 RX ORDER — HYDROCHLOROTHIAZIDE 12.5 MG/1
12.5 CAPSULE ORAL
Qty: 30 | Refills: 1 | Status: ACTIVE | COMMUNITY
Start: 2020-06-25

## 2020-06-25 RX ORDER — METOPROLOL SUCCINATE 25 MG/1
25 TABLET, EXTENDED RELEASE ORAL
Qty: 90 | Refills: 1 | Status: DISCONTINUED | COMMUNITY
Start: 2019-07-03 | End: 2020-06-25

## 2020-06-25 NOTE — REVIEW OF SYSTEMS
[see HPI] : see HPI [Negative] : Heme/Lymph [Recent Weight Gain (___ Lbs)] : no recent weight gain [Dyspnea on exertion] : not dyspnea during exertion [Chest  Pressure] : no chest pressure [Chest Pain] : no chest pain [Lower Ext Edema] : no extremity edema [Dizziness] : no dizziness

## 2020-06-25 NOTE — HISTORY OF PRESENT ILLNESS
[FreeTextEntry1] : The patient was seen on July 18, 2017 with complaints of chest pain and abnormal EKG. She was sent to the hospital where she had a cardiac catheterization. This led to stenting of a severe right coronary artery lesions. The patient has had no further chest pain. She has had no further arm pain.\par \par August 15, 2017. Patient is here in followup. She feels well since the stent. We reviewed all her medications. She still has a little swelling in the groin area from her angiogram, but it is not getting bigger or spreading.\par May 23, 2018. The patient here in followup. She stopped her Plavix in October after seeing Dr. Green. Gets occasional chest pain down the middle of her sternum to her abdomen when she lies down at night, but no exertional symptoms.\par August 13, 2018. The patient returns in followup and for stress echocardiogram. Stress echo showed no ischemia, normal LV function with just minimal MR, mild AI, some features of possible HOCM, but no significant outflow tract gradient. Blood pressure was a little high, as was heart rate, but she had held her beta blocker for the stress test. She was concerned about very mild pedal edema that has developed. I explained to her and her daughter about venous insufficiency and reassured them. Consider support stockings if necessary.\par February 26, 2019. Patient returns in followup. EKG mostly normal with some APCs and nonspecific ST changes with minimal T-wave inversions V3 through 6. She has no complaints but the daughter was concerned that her blood pressure was consistently high, even at Dr. Trevor Tang, where it is borderline to high. We reviewed all her medication and I would first increase her metoprolol given her hypertrophic features and K+ 5.1.\par June 26, 2019. Patient returns in followup. She remains in sinus rhythm at 71 with minimally flattened to inverted T waves III and aVF, and tiny, inverted T waves V3 through 6. She is doing very well, working in the garden all the time, doing a lot of walking, etc., without any symptoms or complaints. Her blood pressure was elevated here again but at her cancer doctor it was totally normal.\par October 23, 2019.  Patient returns in follow-up.  Remains in sinus rhythm at 71 with some diffuse flattened or inverted T waves.  Walking every day without symptoms.  Here with her daughter and they had an upsetting conversation on the way here and her blood pressure is somewhat high but did come down by the end of the exam.  Her blood pressure at Dr. Trevor Tang was normal according to the patient and daughter.  No other complaints\par June 25, 2020.  Patient returns in follow-up with her daughter.  EKG has sinus rhythm at 72 with slight ST-T depression in 1 and aVL along with LVH voltage in lead I and inverted T waves in V3 through 6, definitely more prominent than prior EKG. around mid April it sounds like she may have had a hypertensive emergency as her pressure was something like 250/150 and supposedly verified by a nurse who lives upstairs and she was having problems with her gait etc.  They dealt with Dr. Trevor Tang with tele-visits and he increase metoprolol ER to twice a day and gave her telmisartan 100 instead of irbesartan and when she had a little edema he added hydrochlorothiazide 12.5.  Reportedly her blood pressure came down and she has been fine since.  She denies increase in salt intake although presumably her diet was different being isolated from COVID etc.  Here today the blood pressure is 152/78 and as mentioned she has the deeper T wave inversions.  She seems back to her baseline in every other way and the daughter agrees.  No exertional chest pain or shortness of breath.  Labs were sent and she will return for an echo

## 2020-06-25 NOTE — PHYSICAL EXAM
[General Appearance - Well Developed] : well developed [Normal Appearance] : normal appearance [Well Groomed] : well groomed [General Appearance - Well Nourished] : well nourished [No Deformities] : no deformities [General Appearance - In No Acute Distress] : no acute distress [Normal Conjunctiva] : the conjunctiva exhibited no abnormalities [Eyelids - No Xanthelasma] : the eyelids demonstrated no xanthelasmas [Normal Oral Mucosa] : normal oral mucosa [No Oral Pallor] : no oral pallor [No Oral Cyanosis] : no oral cyanosis [Normal Jugular Venous A Waves Present] : normal jugular venous A waves present [Normal Jugular Venous V Waves Present] : normal jugular venous V waves present [No Jugular Venous Mazariegos A Waves] : no jugular venous mazariegos A waves [Respiration, Rhythm And Depth] : normal respiratory rhythm and effort [Auscultation Breath Sounds / Voice Sounds] : lungs were clear to auscultation bilaterally [Exaggerated Use Of Accessory Muscles For Inspiration] : no accessory muscle use [Murmurs] : no murmurs present [Heart Sounds] : normal S1 and S2 [Heart Rate And Rhythm] : heart rate and rhythm were normal [Edema] : no peripheral edema present [Arterial Pulses Normal] : the arterial pulses were normal [Abdomen Tenderness] : non-tender [Abdomen Soft] : soft [Gait - Sufficient For Exercise Testing] : the gait was sufficient for exercise testing [Abnormal Walk] : normal gait [Abdomen Mass (___ Cm)] : no abdominal mass palpated [Cyanosis, Localized] : no localized cyanosis [Nail Clubbing] : no clubbing of the fingernails [Petechial Hemorrhages (___cm)] : no petechial hemorrhages [Skin Color & Pigmentation] : normal skin color and pigmentation [No Venous Stasis] : no venous stasis [Skin Lesions] : no skin lesions [] : no rash [No Xanthoma] : no  xanthoma was observed [No Skin Ulcers] : no skin ulcer [Oriented To Time, Place, And Person] : oriented to person, place, and time [Affect] : the affect was normal [Mood] : the mood was normal [No Anxiety] : not feeling anxious [FreeTextEntry1] : Good spirits.

## 2020-06-25 NOTE — PHYSICAL EXAM
[General Appearance - Well Developed] : well developed [Normal Appearance] : normal appearance [General Appearance - Well Nourished] : well nourished [Well Groomed] : well groomed [No Deformities] : no deformities [General Appearance - In No Acute Distress] : no acute distress [Normal Conjunctiva] : the conjunctiva exhibited no abnormalities [Eyelids - No Xanthelasma] : the eyelids demonstrated no xanthelasmas [Normal Oral Mucosa] : normal oral mucosa [No Oral Pallor] : no oral pallor [No Oral Cyanosis] : no oral cyanosis [Normal Jugular Venous A Waves Present] : normal jugular venous A waves present [Normal Jugular Venous V Waves Present] : normal jugular venous V waves present [No Jugular Venous Mazariegos A Waves] : no jugular venous mazariegos A waves [Respiration, Rhythm And Depth] : normal respiratory rhythm and effort [Auscultation Breath Sounds / Voice Sounds] : lungs were clear to auscultation bilaterally [Exaggerated Use Of Accessory Muscles For Inspiration] : no accessory muscle use [Heart Sounds] : normal S1 and S2 [Murmurs] : no murmurs present [Heart Rate And Rhythm] : heart rate and rhythm were normal [Edema] : no peripheral edema present [Arterial Pulses Normal] : the arterial pulses were normal [Abdomen Soft] : soft [Abdomen Tenderness] : non-tender [Abdomen Mass (___ Cm)] : no abdominal mass palpated [Abnormal Walk] : normal gait [Gait - Sufficient For Exercise Testing] : the gait was sufficient for exercise testing [Nail Clubbing] : no clubbing of the fingernails [Cyanosis, Localized] : no localized cyanosis [Petechial Hemorrhages (___cm)] : no petechial hemorrhages [Skin Color & Pigmentation] : normal skin color and pigmentation [Skin Lesions] : no skin lesions [No Venous Stasis] : no venous stasis [] : no rash [No Skin Ulcers] : no skin ulcer [No Xanthoma] : no  xanthoma was observed [Oriented To Time, Place, And Person] : oriented to person, place, and time [Affect] : the affect was normal [Mood] : the mood was normal [No Anxiety] : not feeling anxious [FreeTextEntry1] : Good spirits.

## 2020-06-25 NOTE — REASON FOR VISIT
[FreeTextEntry1] : The patient is a 80-year-old woman with diabetes mellitus and atherosclerotic heart disease with an acute stent to her RCA in July, 2017, who is here for followup

## 2020-06-26 LAB
ALBUMIN SERPL ELPH-MCNC: 4.5 G/DL
ALP BLD-CCNC: 35 U/L
ALT SERPL-CCNC: 19 U/L
ANION GAP SERPL CALC-SCNC: 13 MMOL/L
AST SERPL-CCNC: 19 U/L
BASOPHILS # BLD AUTO: 0 K/UL
BASOPHILS NFR BLD AUTO: 0 %
BILIRUB SERPL-MCNC: 0.6 MG/DL
BUN SERPL-MCNC: 21 MG/DL
CALCIUM SERPL-MCNC: 10 MG/DL
CHLORIDE SERPL-SCNC: 103 MMOL/L
CHOLEST SERPL-MCNC: 138 MG/DL
CHOLEST/HDLC SERPL: 3.3 RATIO
CO2 SERPL-SCNC: 25 MMOL/L
CREAT SERPL-MCNC: 0.98 MG/DL
EOSINOPHIL # BLD AUTO: 0.16 K/UL
EOSINOPHIL NFR BLD AUTO: 1 %
ESTIMATED AVERAGE GLUCOSE: 123 MG/DL
GLUCOSE SERPL-MCNC: 118 MG/DL
HBA1C MFR BLD HPLC: 5.9 %
HCT VFR BLD CALC: 31.1 %
HDLC SERPL-MCNC: 42 MG/DL
HGB BLD-MCNC: 9.3 G/DL
LDLC SERPL CALC-MCNC: 36 MG/DL
LYMPHOCYTES # BLD AUTO: 11.71 K/UL
LYMPHOCYTES NFR BLD AUTO: 75 %
MAN DIFF?: NORMAL
MCHC RBC-ENTMCNC: 20 PG
MCHC RBC-ENTMCNC: 29.9 GM/DL
MCV RBC AUTO: 66.7 FL
MONOCYTES # BLD AUTO: 0.31 K/UL
MONOCYTES NFR BLD AUTO: 2 %
NEUTROPHILS # BLD AUTO: 3.43 K/UL
NEUTROPHILS NFR BLD AUTO: 22 %
NT-PROBNP SERPL-MCNC: 418 PG/ML
PLATELET # BLD AUTO: 181 K/UL
POTASSIUM SERPL-SCNC: 5.3 MMOL/L
PROT SERPL-MCNC: 6.3 G/DL
RBC # BLD: 4.66 M/UL
RBC # FLD: 17 %
SODIUM SERPL-SCNC: 141 MMOL/L
TRIGL SERPL-MCNC: 297 MG/DL
WBC # FLD AUTO: 15.61 K/UL

## 2020-08-20 ENCOUNTER — APPOINTMENT (OUTPATIENT)
Dept: CARDIOLOGY | Facility: CLINIC | Age: 80
End: 2020-08-20
Payer: MEDICARE

## 2020-08-20 ENCOUNTER — NON-APPOINTMENT (OUTPATIENT)
Age: 80
End: 2020-08-20

## 2020-08-20 VITALS
BODY MASS INDEX: 29.06 KG/M2 | HEIGHT: 60 IN | DIASTOLIC BLOOD PRESSURE: 85 MMHG | SYSTOLIC BLOOD PRESSURE: 111 MMHG | OXYGEN SATURATION: 97 % | HEART RATE: 74 BPM | WEIGHT: 148 LBS | TEMPERATURE: 96.8 F

## 2020-08-20 PROCEDURE — 93306 TTE W/DOPPLER COMPLETE: CPT

## 2020-08-20 PROCEDURE — 99213 OFFICE O/P EST LOW 20 MIN: CPT | Mod: 25

## 2020-08-20 PROCEDURE — 93000 ELECTROCARDIOGRAM COMPLETE: CPT

## 2020-08-20 NOTE — HISTORY OF PRESENT ILLNESS
[FreeTextEntry1] : The patient was seen on July 18, 2017 with complaints of chest pain and abnormal EKG. She was sent to the hospital where she had a cardiac catheterization. This led to stenting of a severe right coronary artery lesions. The patient has had no further chest pain. She has had no further arm pain.\par \par August 15, 2017. Patient is here in followup. She feels well since the stent. We reviewed all her medications. She still has a little swelling in the groin area from her angiogram, but it is not getting bigger or spreading.\par May 23, 2018. The patient here in followup. She stopped her Plavix in October after seeing Dr. Green. Gets occasional chest pain down the middle of her sternum to her abdomen when she lies down at night, but no exertional symptoms.\par August 13, 2018. The patient returns in followup and for stress echocardiogram. Stress echo showed no ischemia, normal LV function with just minimal MR, mild AI, some features of possible HOCM, but no significant outflow tract gradient. Blood pressure was a little high, as was heart rate, but she had held her beta blocker for the stress test. She was concerned about very mild pedal edema that has developed. I explained to her and her daughter about venous insufficiency and reassured them. Consider support stockings if necessary.\par February 26, 2019. Patient returns in followup. EKG mostly normal with some APCs and nonspecific ST changes with minimal T-wave inversions V3 through 6. She has no complaints but the daughter was concerned that her blood pressure was consistently high, even at Dr. Trevor Tang, where it is borderline to high. We reviewed all her medication and I would first increase her metoprolol given her hypertrophic features and K+ 5.1.\par June 26, 2019. Patient returns in followup. She remains in sinus rhythm at 71 with minimally flattened to inverted T waves III and aVF, and tiny, inverted T waves V3 through 6. She is doing very well, working in the garden all the time, doing a lot of walking, etc., without any symptoms or complaints. Her blood pressure was elevated here again but at her cancer doctor it was totally normal.\par October 23, 2019.  Patient returns in follow-up.  Remains in sinus rhythm at 71 with some diffuse flattened or inverted T waves.  Walking every day without symptoms.  Here with her daughter and they had an upsetting conversation on the way here and her blood pressure is somewhat high but did come down by the end of the exam.  Her blood pressure at Dr. Trevor Tang was normal according to the patient and daughter.  No other complaints\par June 25, 2020.  Patient returns in follow-up with her daughter.  EKG has sinus rhythm at 72 with slight ST-T depression in 1 and aVL along with LVH voltage in lead I and inverted T waves in V3 through 6, definitely more prominent than prior EKG. around mid April it sounds like she may have had a hypertensive emergency as her pressure was something like 250/150 and supposedly verified by a nurse who lives upstairs and she was having problems with her gait etc.  They dealt with Dr. Trevor Tang with tele-visits and he increase metoprolol ER to twice a day and gave her telmisartan 100 instead of irbesartan and when she had a little edema he added hydrochlorothiazide 12.5.  Reportedly her blood pressure came down and she has been fine since.  She denies increase in salt intake although presumably her diet was different being isolated from COVID etc.  Here today the blood pressure is 152/78 and as mentioned she has the deeper T wave inversions.  She seems back to her baseline in every other way and the daughter agrees.  No exertional chest pain or shortness of breath.  Labs were sent and she will return for an echo\par Labs were unremarkable with normal kidney function, pro BNP only 418, excellent lipid profile and hemoglobin A1c 5.9.  Chronic microcytic anemia but her white count was slightly up at 15.6.\par August 20, 2020.  Patient returns in follow-up and has done well since her episode in July.  Blood pressure here was excellent.. Echo mostly unchanged with normal LV size and function, basal septal hypertrophy but no MED., ascending aorta slightly larger and AI slightly increased as is RVSP.  EKG unchanged and still has T wave inversions 1 aVL V3 through 6

## 2020-08-20 NOTE — PHYSICAL EXAM
[General Appearance - Well Developed] : well developed [General Appearance - Well Nourished] : well nourished [Well Groomed] : well groomed [Normal Appearance] : normal appearance [No Deformities] : no deformities [General Appearance - In No Acute Distress] : no acute distress [Normal Conjunctiva] : the conjunctiva exhibited no abnormalities [Normal Oral Mucosa] : normal oral mucosa [No Oral Pallor] : no oral pallor [Eyelids - No Xanthelasma] : the eyelids demonstrated no xanthelasmas [Normal Jugular Venous A Waves Present] : normal jugular venous A waves present [No Oral Cyanosis] : no oral cyanosis [Normal Jugular Venous V Waves Present] : normal jugular venous V waves present [No Jugular Venous Mazariegos A Waves] : no jugular venous mazariegos A waves [Respiration, Rhythm And Depth] : normal respiratory rhythm and effort [Exaggerated Use Of Accessory Muscles For Inspiration] : no accessory muscle use [Heart Rate And Rhythm] : heart rate and rhythm were normal [Auscultation Breath Sounds / Voice Sounds] : lungs were clear to auscultation bilaterally [Heart Sounds] : normal S1 and S2 [Arterial Pulses Normal] : the arterial pulses were normal [Murmurs] : no murmurs present [Edema] : no peripheral edema present [Abdomen Soft] : soft [Abdomen Tenderness] : non-tender [Abdomen Mass (___ Cm)] : no abdominal mass palpated [Nail Clubbing] : no clubbing of the fingernails [Abnormal Walk] : normal gait [Gait - Sufficient For Exercise Testing] : the gait was sufficient for exercise testing [Cyanosis, Localized] : no localized cyanosis [Petechial Hemorrhages (___cm)] : no petechial hemorrhages [Skin Color & Pigmentation] : normal skin color and pigmentation [Skin Lesions] : no skin lesions [] : no rash [No Venous Stasis] : no venous stasis [Oriented To Time, Place, And Person] : oriented to person, place, and time [No Xanthoma] : no  xanthoma was observed [No Skin Ulcers] : no skin ulcer [Mood] : the mood was normal [Affect] : the affect was normal [No Anxiety] : not feeling anxious [FreeTextEntry1] : Good spirits.

## 2020-08-20 NOTE — REVIEW OF SYSTEMS
[Recent Weight Gain (___ Lbs)] : no recent weight gain [see HPI] : see HPI [Dyspnea on exertion] : not dyspnea during exertion [Chest  Pressure] : no chest pressure [Chest Pain] : no chest pain [Skin Lesions] : skin lesion(s): [Lower Ext Edema] : no extremity edema [Dizziness] : no dizziness [Negative] : Psychiatric

## 2021-08-24 ENCOUNTER — NON-APPOINTMENT (OUTPATIENT)
Age: 81
End: 2021-08-24

## 2021-08-24 ENCOUNTER — APPOINTMENT (OUTPATIENT)
Dept: CARDIOLOGY | Facility: CLINIC | Age: 81
End: 2021-08-24
Payer: MEDICARE

## 2021-08-24 VITALS
BODY MASS INDEX: 28.12 KG/M2 | DIASTOLIC BLOOD PRESSURE: 81 MMHG | HEART RATE: 77 BPM | SYSTOLIC BLOOD PRESSURE: 164 MMHG | WEIGHT: 144 LBS | OXYGEN SATURATION: 99 %

## 2021-08-24 VITALS — SYSTOLIC BLOOD PRESSURE: 146 MMHG | DIASTOLIC BLOOD PRESSURE: 78 MMHG | HEART RATE: 66 BPM

## 2021-08-24 PROCEDURE — 93000 ELECTROCARDIOGRAM COMPLETE: CPT

## 2021-08-24 PROCEDURE — 99214 OFFICE O/P EST MOD 30 MIN: CPT

## 2021-08-24 RX ORDER — ASPIRIN ENTERIC COATED TABLETS 81 MG 81 MG/1
81 TABLET, DELAYED RELEASE ORAL
Qty: 30 | Refills: 3 | Status: ACTIVE | COMMUNITY
Start: 2021-08-24 | End: 1900-01-01

## 2021-08-24 RX ORDER — PANTOPRAZOLE 40 MG/1
40 TABLET, DELAYED RELEASE ORAL DAILY
Qty: 1 | Refills: 1 | Status: DISCONTINUED | COMMUNITY
Start: 2017-07-19 | End: 2021-08-24

## 2021-11-16 ENCOUNTER — APPOINTMENT (OUTPATIENT)
Dept: CARDIOLOGY | Facility: CLINIC | Age: 81
End: 2021-11-16
Payer: MEDICARE

## 2021-11-16 PROCEDURE — 93306 TTE W/DOPPLER COMPLETE: CPT

## 2021-11-16 NOTE — REASON FOR VISIT
[FreeTextEntry1] : The patient is a 81-year-old woman with diabetes mellitus and atherosclerotic heart disease with an acute stent to her RCA in July, 2017, who is here for followup

## 2021-11-16 NOTE — HISTORY OF PRESENT ILLNESS
[FreeTextEntry1] : The patient was seen on July 18, 2017 with complaints of chest pain and abnormal EKG. She was sent to the hospital where she had a cardiac catheterization. This led to stenting of a severe right coronary artery lesions. The patient has had no further chest pain. She has had no further arm pain.\par \par August 15, 2017. Patient is here in followup. She feels well since the stent. We reviewed all her medications. She still has a little swelling in the groin area from her angiogram, but it is not getting bigger or spreading.\par May 23, 2018. The patient here in followup. She stopped her Plavix in October after seeing Dr. Green. Gets occasional chest pain down the middle of her sternum to her abdomen when she lies down at night, but no exertional symptoms.\par August 13, 2018. The patient returns in followup and for stress echocardiogram. Stress echo showed no ischemia, normal LV function with just minimal MR, mild AI, some features of possible HOCM, but no significant outflow tract gradient. Blood pressure was a little high, as was heart rate, but she had held her beta blocker for the stress test. She was concerned about very mild pedal edema that has developed. I explained to her and her daughter about venous insufficiency and reassured them. Consider support stockings if necessary.\par February 26, 2019. Patient returns in followup. EKG mostly normal with some APCs and nonspecific ST changes with minimal T-wave inversions V3 through 6. She has no complaints but the daughter was concerned that her blood pressure was consistently high, even at Dr. Trevor Tang, where it is borderline to high. We reviewed all her medication and I would first increase her metoprolol given her hypertrophic features and K+ 5.1.\par June 26, 2019. Patient returns in followup. She remains in sinus rhythm at 71 with minimally flattened to inverted T waves III and aVF, and tiny, inverted T waves V3 through 6. She is doing very well, working in the garden all the time, doing a lot of walking, etc., without any symptoms or complaints. Her blood pressure was elevated here again but at her cancer doctor it was totally normal.\par October 23, 2019.  Patient returns in follow-up.  Remains in sinus rhythm at 71 with some diffuse flattened or inverted T waves.  Walking every day without symptoms.  Here with her daughter and they had an upsetting conversation on the way here and her blood pressure is somewhat high but did come down by the end of the exam.  Her blood pressure at Dr. Trevor Tang was normal according to the patient and daughter.  No other complaints\par June 25, 2020.  Patient returns in follow-up with her daughter.  EKG has sinus rhythm at 72 with slight ST-T depression in 1 and aVL along with LVH voltage in lead I and inverted T waves in V3 through 6, definitely more prominent than prior EKG. around mid April it sounds like she may have had a hypertensive emergency as her pressure was something like 250/150 and supposedly verified by a nurse who lives upstairs and she was having problems with her gait etc.  They dealt with Dr. Trevor Tang with tele-visits and he increase metoprolol ER to twice a day and gave her telmisartan 100 instead of irbesartan and when she had a little edema he added hydrochlorothiazide 12.5.  Reportedly her blood pressure came down and she has been fine since.  She denies increase in salt intake although presumably her diet was different being isolated from COVID etc.  Here today the blood pressure is 152/78 and as mentioned she has the deeper T wave inversions.  She seems back to her baseline in every other way and the daughter agrees.  No exertional chest pain or shortness of breath.  Labs were sent and she will return for an echo\par Labs were unremarkable with normal kidney function, pro BNP only 418, excellent lipid profile and hemoglobin A1c 5.9.  Chronic microcytic anemia but her white count was slightly up at 15.6.\par August 20, 2020.  Patient returns in follow-up and has done well since her episode in July.  Blood pressure here was excellent.. Echo mostly unchanged with normal LV size and function, basal septal hypertrophy but no MED., ascending aorta slightly larger and AI slightly increased as is RVSP.  EKG unchanged and still has T wave inversions 1 aVL V3 through 6\par August 24, 2021.  Patient here for follow-up first time in over 1 year.  Mostly because of Covid; she herself had no issues and has been vaccinated thanks to her daughter's efforts.  She recently saw Dr. Leigh and Dr. Trevor Tang and claims her blood pressure was good and her blood tests including cholesterol all within normal limits.  I will try to obtain them.  She denies exertional chest pain or shortness of breath.  Her abnormal EKG is unchanged with T wave inversions V3 through 6.  Her echo last year had mild to moderate AI so another echo will be repeated before the end of the year.  RVSP then was 44.  We reviewed all her medications and there has been no change\par 2021-11-16.Returns for echo.  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 or LVOT gradient.  Mild to moderate AI.  Ascending aorta measured 3.6 (down from 4.0 last echo) normal LV systolic function with LVEF 67%.  Normal RV function. \par

## 2022-05-04 RX ORDER — METOPROLOL SUCCINATE 50 MG/1
50 TABLET, EXTENDED RELEASE ORAL
Qty: 180 | Refills: 0 | Status: ACTIVE | COMMUNITY
Start: 2017-07-18

## 2022-08-16 ENCOUNTER — APPOINTMENT (OUTPATIENT)
Dept: CARDIOLOGY | Facility: CLINIC | Age: 82
End: 2022-08-16

## 2022-08-16 ENCOUNTER — LABORATORY RESULT (OUTPATIENT)
Age: 82
End: 2022-08-16

## 2022-08-16 ENCOUNTER — NON-APPOINTMENT (OUTPATIENT)
Age: 82
End: 2022-08-16

## 2022-08-16 VITALS — SYSTOLIC BLOOD PRESSURE: 175 MMHG | HEART RATE: 68 BPM | DIASTOLIC BLOOD PRESSURE: 76 MMHG

## 2022-08-16 VITALS
SYSTOLIC BLOOD PRESSURE: 151 MMHG | WEIGHT: 145 LBS | BODY MASS INDEX: 28.32 KG/M2 | HEART RATE: 67 BPM | OXYGEN SATURATION: 95 % | DIASTOLIC BLOOD PRESSURE: 76 MMHG

## 2022-08-16 DIAGNOSIS — E11.9 TYPE 2 DIABETES MELLITUS W/OUT COMPLICATIONS: ICD-10-CM

## 2022-08-16 DIAGNOSIS — I10 ESSENTIAL (PRIMARY) HYPERTENSION: ICD-10-CM

## 2022-08-16 PROCEDURE — 93000 ELECTROCARDIOGRAM COMPLETE: CPT

## 2022-08-16 PROCEDURE — 36415 COLL VENOUS BLD VENIPUNCTURE: CPT

## 2022-08-16 PROCEDURE — 99214 OFFICE O/P EST MOD 30 MIN: CPT

## 2022-08-16 RX ORDER — SITAGLIPTIN 50 MG/1
50 TABLET, FILM COATED ORAL DAILY
Qty: 90 | Refills: 1 | Status: ACTIVE | COMMUNITY
Start: 2022-08-16

## 2022-08-16 RX ORDER — METFORMIN HYDROCHLORIDE 1000 MG/1
1000 TABLET, COATED ORAL
Refills: 3 | Status: ACTIVE | COMMUNITY
Start: 2022-08-16

## 2022-08-17 LAB
ALBUMIN SERPL ELPH-MCNC: 4.3 G/DL
ALP BLD-CCNC: 34 U/L
ALT SERPL-CCNC: 14 U/L
ANION GAP SERPL CALC-SCNC: 11 MMOL/L
AST SERPL-CCNC: 16 U/L
BASOPHILS # BLD AUTO: 0.06 K/UL
BASOPHILS NFR BLD AUTO: 0.4 %
BILIRUB SERPL-MCNC: 0.6 MG/DL
BUN SERPL-MCNC: 16 MG/DL
CALCIUM SERPL-MCNC: 9.8 MG/DL
CHLORIDE SERPL-SCNC: 103 MMOL/L
CHOLEST SERPL-MCNC: 143 MG/DL
CO2 SERPL-SCNC: 25 MMOL/L
CREAT SERPL-MCNC: 0.89 MG/DL
EGFR: 66 ML/MIN/1.73M2
EOSINOPHIL # BLD AUTO: 0.23 K/UL
EOSINOPHIL NFR BLD AUTO: 1.6 %
ESTIMATED AVERAGE GLUCOSE: 134 MG/DL
GLUCOSE SERPL-MCNC: 119 MG/DL
HBA1C MFR BLD HPLC: 6.3 %
HCT VFR BLD CALC: 29.9 %
HDLC SERPL-MCNC: 40 MG/DL
HGB BLD-MCNC: 8.8 G/DL
IMM GRANULOCYTES NFR BLD AUTO: 0.1 %
LDLC SERPL CALC-MCNC: 32 MG/DL
LYMPHOCYTES # BLD AUTO: 9.03 K/UL
LYMPHOCYTES NFR BLD AUTO: 63.2 %
MAN DIFF?: NORMAL
MCHC RBC-ENTMCNC: 20.2 PG
MCHC RBC-ENTMCNC: 29.4 GM/DL
MCV RBC AUTO: 68.6 FL
MONOCYTES # BLD AUTO: 0.64 K/UL
MONOCYTES NFR BLD AUTO: 4.5 %
NEUTROPHILS # BLD AUTO: 4.31 K/UL
NEUTROPHILS NFR BLD AUTO: 30.2 %
NONHDLC SERPL-MCNC: 103 MG/DL
NT-PROBNP SERPL-MCNC: 905 PG/ML
PLATELET # BLD AUTO: 202 K/UL
POTASSIUM SERPL-SCNC: 4.6 MMOL/L
PROT SERPL-MCNC: 6 G/DL
RBC # BLD: 4.36 M/UL
RBC # FLD: 17.4 %
SODIUM SERPL-SCNC: 140 MMOL/L
TRIGL SERPL-MCNC: 357 MG/DL
TSH SERPL-ACNC: 3.82 UIU/ML
WBC # FLD AUTO: 14.29 K/UL

## 2022-10-25 ENCOUNTER — APPOINTMENT (OUTPATIENT)
Dept: CARDIOLOGY | Facility: CLINIC | Age: 82
End: 2022-10-25

## 2022-12-07 ENCOUNTER — APPOINTMENT (OUTPATIENT)
Dept: CARDIOLOGY | Facility: CLINIC | Age: 82
End: 2022-12-07

## 2023-03-02 ENCOUNTER — APPOINTMENT (OUTPATIENT)
Dept: CARDIOLOGY | Facility: CLINIC | Age: 83
End: 2023-03-02
Payer: MEDICARE

## 2023-03-02 PROCEDURE — 93325 DOPPLER ECHO COLOR FLOW MAPG: CPT

## 2023-03-02 PROCEDURE — 93320 DOPPLER ECHO COMPLETE: CPT

## 2023-03-02 PROCEDURE — 93351 STRESS TTE COMPLETE: CPT

## 2023-03-02 NOTE — PHYSICAL EXAM
[General Appearance - Well Developed] : well developed [Normal Appearance] : normal appearance [Well Groomed] : well groomed [General Appearance - Well Nourished] : well nourished [No Deformities] : no deformities [General Appearance - In No Acute Distress] : no acute distress [Normal Conjunctiva] : the conjunctiva exhibited no abnormalities [Eyelids - No Xanthelasma] : the eyelids demonstrated no xanthelasmas [Normal Oral Mucosa] : normal oral mucosa [No Oral Pallor] : no oral pallor [No Oral Cyanosis] : no oral cyanosis [Normal Jugular Venous A Waves Present] : normal jugular venous A waves present [Normal Jugular Venous V Waves Present] : normal jugular venous V waves present [No Jugular Venous Mazariegos A Waves] : no jugular venous mazariegos A waves [Respiration, Rhythm And Depth] : normal respiratory rhythm and effort negative [Exaggerated Use Of Accessory Muscles For Inspiration] : no accessory muscle use [Auscultation Breath Sounds / Voice Sounds] : lungs were clear to auscultation bilaterally [Heart Rate And Rhythm] : heart rate and rhythm were normal [Heart Sounds] : normal S1 and S2 [Murmurs] : no murmurs present [Arterial Pulses Normal] : the arterial pulses were normal [Edema] : no peripheral edema present [Abdomen Soft] : soft [Abdomen Tenderness] : non-tender [Abdomen Mass (___ Cm)] : no abdominal mass palpated [Abnormal Walk] : normal gait [Gait - Sufficient For Exercise Testing] : the gait was sufficient for exercise testing [Nail Clubbing] : no clubbing of the fingernails [Cyanosis, Localized] : no localized cyanosis [Petechial Hemorrhages (___cm)] : no petechial hemorrhages [Skin Color & Pigmentation] : normal skin color and pigmentation [] : no rash [No Venous Stasis] : no venous stasis [Skin Lesions] : no skin lesions [No Skin Ulcers] : no skin ulcer [No Xanthoma] : no  xanthoma was observed [Oriented To Time, Place, And Person] : oriented to person, place, and time [Affect] : the affect was normal [Mood] : the mood was normal [No Anxiety] : not feeling anxious [FreeTextEntry1] : Good spirits.

## 2023-03-02 NOTE — HISTORY OF PRESENT ILLNESS
[FreeTextEntry1] : The patient was seen on July 18, 2017 with complaints of chest pain and abnormal EKG. She was sent to the hospital where she had a cardiac catheterization. This led to stenting of a severe right coronary artery lesions. The patient has had no further chest pain. She has had no further arm pain.\par \par August 15, 2017. Patient is here in followup. She feels well since the stent. We reviewed all her medications. She still has a little swelling in the groin area from her angiogram, but it is not getting bigger or spreading.\par May 23, 2018. The patient here in followup. She stopped her Plavix in October after seeing Dr. Green. Gets occasional chest pain down the middle of her sternum to her abdomen when she lies down at night, but no exertional symptoms.\par August 13, 2018. The patient returns in followup and for stress echocardiogram. Stress echo showed no ischemia, normal LV function with just minimal MR, mild AI, some features of possible HOCM, but no significant outflow tract gradient. Blood pressure was a little high, as was heart rate, but she had held her beta blocker for the stress test. She was concerned about very mild pedal edema that has developed. I explained to her and her daughter about venous insufficiency and reassured them. Consider support stockings if necessary.\par February 26, 2019. Patient returns in followup. EKG mostly normal with some APCs and nonspecific ST changes with minimal T-wave inversions V3 through 6. She has no complaints but the daughter was concerned that her blood pressure was consistently high, even at Dr. Trevor Tang, where it is borderline to high. We reviewed all her medication and I would first increase her metoprolol given her hypertrophic features and K+ 5.1.\par June 26, 2019. Patient returns in followup. She remains in sinus rhythm at 71 with minimally flattened to inverted T waves III and aVF, and tiny, inverted T waves V3 through 6. She is doing very well, working in the garden all the time, doing a lot of walking, etc., without any symptoms or complaints. Her blood pressure was elevated here again but at her cancer doctor it was totally normal.\par October 23, 2019.  Patient returns in follow-up.  Remains in sinus rhythm at 71 with some diffuse flattened or inverted T waves.  Walking every day without symptoms.  Here with her daughter and they had an upsetting conversation on the way here and her blood pressure is somewhat high but did come down by the end of the exam.  Her blood pressure at Dr. Trevor Tang was normal according to the patient and daughter.  No other complaints\par June 25, 2020.  Patient returns in follow-up with her daughter.  EKG has sinus rhythm at 72 with slight ST-T depression in 1 and aVL along with LVH voltage in lead I and inverted T waves in V3 through 6, definitely more prominent than prior EKG. around mid April it sounds like she may have had a hypertensive emergency as her pressure was something like 250/150 and supposedly verified by a nurse who lives upstairs and she was having problems with her gait etc.  They dealt with Dr. Trevor Tang with tele-visits and he increase metoprolol ER to twice a day and gave her telmisartan 100 instead of irbesartan and when she had a little edema he added hydrochlorothiazide 12.5.  Reportedly her blood pressure came down and she has been fine since.  She denies increase in salt intake although presumably her diet was different being isolated from COVID etc.  Here today the blood pressure is 152/78 and as mentioned she has the deeper T wave inversions.  She seems back to her baseline in every other way and the daughter agrees.  No exertional chest pain or shortness of breath.  Labs were sent and she will return for an echo\par Labs were unremarkable with normal kidney function, pro BNP only 418, excellent lipid profile and hemoglobin A1c 5.9.  Chronic microcytic anemia but her white count was slightly up at 15.6.\par August 20, 2020.  Patient returns in follow-up and has done well since her episode in July.  Blood pressure here was excellent.. Echo mostly unchanged with normal LV size and function, basal septal hypertrophy but no MED., ascending aorta slightly larger and AI slightly increased as is RVSP.  EKG unchanged and still has T wave inversions 1 aVL V3 through 6\par August 24, 2021.  Patient here for follow-up first time in over 1 year.  Mostly because of Covid; she herself had no issues and has been vaccinated thanks to her daughter's efforts.  She recently saw Dr. Leigh and Dr. Trevor Tang and claims her blood pressure was good and her blood tests including cholesterol all within normal limits.  I will try to obtain them.  She denies exertional chest pain or shortness of breath.  Her abnormal EKG is unchanged with T wave inversions V3 through 6.  Her echo last year had mild to moderate AI so another echo will be repeated before the end of the year.  RVSP then was 44.  We reviewed all her medications and there has been no change\par 2021-11-16.Returns for echo.  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 or LVOT gradient.  Mild to moderate AI.  Ascending aorta measured 3.6 (down from 4.0 last echo) normal LV systolic function with LVEF 67%.  Normal RV function. \par August 16, 2022.  First visit in almost 1 year.  Remains in sinus rhythm at 64 with LVH voltage in the limb leads and with slight T wave inversions V3 through 6 as well as 3 and aVF.  She herself has no complaints.  The daughter mentioned that the insurance made them change the Janumet to Januvia 5 mg and metformin 1000 mg both once daily.  She said her blood pressure with the internist is always a little elevated but has been constant and the same so "not to worry about it".  We reviewed all her medications as well as her blood pressure here and I would like the blood pressure control a little better so I suggested they talk to the internist about adding amlodipine either 2.5 or 5 mg.  Meanwhile at some point she should come back for a stress echocardiogram.\par May 2, 2023. Patient returns with her daughter for stress echocardiogram. We had to devise a manual protocol which she only went 0.8 mph but we kept increasing the elevation until we got 20 degrees elevated. At that point we reached past her target heart rate getting 133 and a blood pressure of 162/80. No symptoms. Her resting ECG has ST and T wave depressions 2 3 aVF V5 and V6 and these did not really change with exercise, may be less than a millimeter increase in depression. Absolutely no VPCs. Absolutely no chest pain or severe shortness of breath. Absolutely no echocardiographic evidence of ischemia. Her resting echo has mild MR, may be some S.A.M. but no LVOT gradient. Concentric LVH with some diastolic dysfunction. Normal systolic function and normal RV function. Mild TR with RVSP 29. Mild to moderate AI. Mostly unchanged from previous echo. Patient and daughter reassured. \par

## 2023-03-02 NOTE — REASON FOR VISIT
[FreeTextEntry1] : The patient is an 82-year-old woman with diabetes mellitus and atherosclerotic heart disease with an acute stent to her RCA in July, 2017, who is here for followup

## 2023-03-02 NOTE — REVIEW OF SYSTEMS
[Negative] : Heme/Lymph [Weight Gain (___ Lbs)] : [unfilled] ~Ulb weight gain [Weight Loss (___ Lbs)] : no recent weight loss

## 2024-01-23 ENCOUNTER — APPOINTMENT (OUTPATIENT)
Dept: CARDIOLOGY | Facility: CLINIC | Age: 84
End: 2024-01-23
Payer: MEDICARE

## 2024-01-23 ENCOUNTER — NON-APPOINTMENT (OUTPATIENT)
Age: 84
End: 2024-01-23

## 2024-01-23 VITALS
HEART RATE: 82 BPM | DIASTOLIC BLOOD PRESSURE: 71 MMHG | SYSTOLIC BLOOD PRESSURE: 124 MMHG | WEIGHT: 145 LBS | RESPIRATION RATE: 17 BRPM | OXYGEN SATURATION: 82 % | BODY MASS INDEX: 28.32 KG/M2

## 2024-01-23 DIAGNOSIS — I35.1 NONRHEUMATIC AORTIC (VALVE) INSUFFICIENCY: ICD-10-CM

## 2024-01-23 DIAGNOSIS — I25.10 ATHEROSCLEROTIC HEART DISEASE OF NATIVE CORONARY ARTERY W/OUT ANGINA PECTORIS: ICD-10-CM

## 2024-01-23 DIAGNOSIS — I42.2 OTHER HYPERTROPHIC CARDIOMYOPATHY: ICD-10-CM

## 2024-01-23 DIAGNOSIS — R94.31 ABNORMAL ELECTROCARDIOGRAM [ECG] [EKG]: ICD-10-CM

## 2024-01-23 PROCEDURE — G2211 COMPLEX E/M VISIT ADD ON: CPT

## 2024-01-23 PROCEDURE — 99214 OFFICE O/P EST MOD 30 MIN: CPT

## 2024-01-23 PROCEDURE — 93000 ELECTROCARDIOGRAM COMPLETE: CPT
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
## 2024-01-23 NOTE — HISTORY OF PRESENT ILLNESS
[FreeTextEntry1] : The patient was seen on July 18, 2017 with complaints of chest pain and abnormal EKG. She was sent to the hospital where she had a cardiac catheterization. This led to stenting of a severe right coronary artery lesions. The patient has had no further chest pain. She has had no further arm pain.  August 15, 2017. Patient is here in followup. She feels well since the stent. We reviewed all her medications. She still has a little swelling in the groin area from her angiogram, but it is not getting bigger or spreading. May 23, 2018. The patient here in followup. She stopped her Plavix in October after seeing Dr. Green. Gets occasional chest pain down the middle of her sternum to her abdomen when she lies down at night, but no exertional symptoms. August 13, 2018. The patient returns in followup and for stress echocardiogram. Stress echo showed no ischemia, normal LV function with just minimal MR, mild AI, some features of possible HOCM, but no significant outflow tract gradient. Blood pressure was a little high, as was heart rate, but she had held her beta blocker for the stress test. She was concerned about very mild pedal edema that has developed. I explained to her and her daughter about venous insufficiency and reassured them. Consider support stockings if necessary. February 26, 2019. Patient returns in followup. EKG mostly normal with some APCs and nonspecific ST changes with minimal T-wave inversions V3 through 6. She has no complaints but the daughter was concerned that her blood pressure was consistently high, even at Dr. Trevor Tang, where it is borderline to high. We reviewed all her medication and I would first increase her metoprolol given her hypertrophic features and K+ 5.1. June 26, 2019. Patient returns in followup. She remains in sinus rhythm at 71 with minimally flattened to inverted T waves III and aVF, and tiny, inverted T waves V3 through 6. She is doing very well, working in the garden all the time, doing a lot of walking, etc., without any symptoms or complaints. Her blood pressure was elevated here again but at her cancer doctor it was totally normal. October 23, 2019.  Patient returns in follow-up.  Remains in sinus rhythm at 71 with some diffuse flattened or inverted T waves.  Walking every day without symptoms.  Here with her daughter and they had an upsetting conversation on the way here and her blood pressure is somewhat high but did come down by the end of the exam.  Her blood pressure at Dr. Trevor Tang was normal according to the patient and daughter.  No other complaints June 25, 2020.  Patient returns in follow-up with her daughter.  EKG has sinus rhythm at 72 with slight ST-T depression in 1 and aVL along with LVH voltage in lead I and inverted T waves in V3 through 6, definitely more prominent than prior EKG. around mid April it sounds like she may have had a hypertensive emergency as her pressure was something like 250/150 and supposedly verified by a nurse who lives upstairs and she was having problems with her gait etc.  They dealt with Dr. Trevor Tang with tele-visits and he increase metoprolol ER to twice a day and gave her telmisartan 100 instead of irbesartan and when she had a little edema he added hydrochlorothiazide 12.5.  Reportedly her blood pressure came down and she has been fine since.  She denies increase in salt intake although presumably her diet was different being isolated from COVID etc.  Here today the blood pressure is 152/78 and as mentioned she has the deeper T wave inversions.  She seems back to her baseline in every other way and the daughter agrees.  No exertional chest pain or shortness of breath.  Labs were sent and she will return for an echo Labs were unremarkable with normal kidney function, pro BNP only 418, excellent lipid profile and hemoglobin A1c 5.9.  Chronic microcytic anemia but her white count was slightly up at 15.6. August 20, 2020.  Patient returns in follow-up and has done well since her episode in July.  Blood pressure here was excellent.. Echo mostly unchanged with normal LV size and function, basal septal hypertrophy but no MED., ascending aorta slightly larger and AI slightly increased as is RVSP.  EKG unchanged and still has T wave inversions 1 aVL V3 through 6 August 24, 2021.  Patient here for follow-up first time in over 1 year.  Mostly because of Covid; she herself had no issues and has been vaccinated thanks to her daughter's efforts.  She recently saw Dr. Leigh and Dr. Trevor Tang and claims her blood pressure was good and her blood tests including cholesterol all within normal limits.  I will try to obtain them.  She denies exertional chest pain or shortness of breath.  Her abnormal EKG is unchanged with T wave inversions V3 through 6.  Her echo last year had mild to moderate AI so another echo will be repeated before the end of the year.  RVSP then was 44.  We reviewed all her medications and there has been no change 2021-11-16.Returns for echo.  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 or LVOT gradient.  Mild to moderate AI.  Ascending aorta measured 3.6 (down from 4.0 last echo) normal LV systolic function with LVEF 67%.  Normal RV function.  August 16, 2022.  First visit in almost 1 year.  Remains in sinus rhythm at 64 with LVH voltage in the limb leads and with slight T wave inversions V3 through 6 as well as 3 and aVF.  She herself has no complaints.  The daughter mentioned that the insurance made them change the Janumet to Januvia 5 mg and metformin 1000 mg both once daily.  She said her blood pressure with the internist is always a little elevated but has been constant and the same so "not to worry about it".  We reviewed all her medications as well as her blood pressure here and I would like the blood pressure control a little better so I suggested they talk to the internist about adding amlodipine either 2.5 or 5 mg.  Meanwhile at some point she should come back for a stress echocardiogram. May 2, 2023. Patient returns with her daughter for stress echocardiogram. We had to devise a manual protocol which she only went 0.8 mph but we kept increasing the elevation until we got 20 degrees elevated. At that point we reached past her target heart rate getting 133 and a blood pressure of 162/80. No symptoms. Her resting ECG has ST and T wave depressions 2 3 aVF V5 and V6 and these did not really change with exercise, maybe less than a millimeter increase in depression. Absolutely no VPCs. Absolutely no chest pain or severe shortness of breath. Absolutely no echocardiographic evidence of ischemia. Her resting echo has mild MR, maybe some S.A.M. but no LVOT gradient. Concentric LVH with some diastolic dysfunction. Normal systolic function and normal RV function. Mild TR with RVSP 29. Mild to moderate AI. Mostly unchanged from previous echo. Patient and daughter reassured.  January 23, 2024.  Patient returns in follow-up.  No interval medical or cardiac issues.  No complaints.  Here with her daughter and in good spirits.  Reviewed her last stress and echo.

## 2024-01-23 NOTE — DISCUSSION/SUMMARY
[FreeTextEntry1] : Doing well overall both in terms of coronary disease valve disease and diastolic dysfunction.  If significant change in proBNP we will schedule another echo; otherwise we will just have a clinical follow-up in 6 months. [EKG obtained to assist in diagnosis and management of assessed problem(s)] : EKG obtained to assist in diagnosis and management of assessed problem(s)

## 2024-01-23 NOTE — REASON FOR VISIT
[FreeTextEntry1] : The patient is an 83-year-old woman with diabetes mellitus and atherosclerotic heart disease with an acute stent to her RCA in July, 2017, who is here for followup

## 2024-01-23 NOTE — REVIEW OF SYSTEMS
[Negative] : Heme/Lymph [Weight Gain (___ Lbs)] : no recent weight gain [Weight Loss (___ Lbs)] : no recent weight loss

## 2024-01-24 LAB
ALBUMIN SERPL ELPH-MCNC: 4.5 G/DL
ALP BLD-CCNC: 52 U/L
ALT SERPL-CCNC: 14 U/L
ANION GAP SERPL CALC-SCNC: 16 MMOL/L
AST SERPL-CCNC: 17 U/L
BILIRUB SERPL-MCNC: 0.4 MG/DL
BUN SERPL-MCNC: 23 MG/DL
CALCIUM SERPL-MCNC: 9.9 MG/DL
CHLORIDE SERPL-SCNC: 98 MMOL/L
CHOLEST SERPL-MCNC: 140 MG/DL
CO2 SERPL-SCNC: 24 MMOL/L
CREAT SERPL-MCNC: 1.08 MG/DL
EGFR: 52 ML/MIN/1.73M2
ESTIMATED AVERAGE GLUCOSE: 137 MG/DL
GLUCOSE SERPL-MCNC: 146 MG/DL
HBA1C MFR BLD HPLC: 6.4 %
HCT VFR BLD CALC: 33.5 %
HDLC SERPL-MCNC: 42 MG/DL
HGB BLD-MCNC: 9.7 G/DL
LDLC SERPL CALC-MCNC: 43 MG/DL
MCHC RBC-ENTMCNC: 19.3 PG
MCHC RBC-ENTMCNC: 29 GM/DL
MCV RBC AUTO: 66.6 FL
NONHDLC SERPL-MCNC: 98 MG/DL
NT-PROBNP SERPL-MCNC: 346 PG/ML
PLATELET # BLD AUTO: 271 K/UL
POTASSIUM SERPL-SCNC: 4.4 MMOL/L
PROT SERPL-MCNC: 6.5 G/DL
RBC # BLD: 5.03 M/UL
RBC # FLD: 16.8 %
SODIUM SERPL-SCNC: 137 MMOL/L
TRIGL SERPL-MCNC: 373 MG/DL
WBC # FLD AUTO: 16.32 K/UL

## 2024-07-29 ENCOUNTER — NON-APPOINTMENT (OUTPATIENT)
Age: 84
End: 2024-07-29

## 2024-07-29 ENCOUNTER — LABORATORY RESULT (OUTPATIENT)
Age: 84
End: 2024-07-29

## 2024-07-29 ENCOUNTER — APPOINTMENT (OUTPATIENT)
Dept: CARDIOLOGY | Facility: CLINIC | Age: 84
End: 2024-07-29
Payer: MEDICARE

## 2024-07-29 VITALS — SYSTOLIC BLOOD PRESSURE: 160 MMHG | HEART RATE: 70 BPM | DIASTOLIC BLOOD PRESSURE: 74 MMHG

## 2024-07-29 VITALS
SYSTOLIC BLOOD PRESSURE: 162 MMHG | OXYGEN SATURATION: 99 % | DIASTOLIC BLOOD PRESSURE: 76 MMHG | BODY MASS INDEX: 26.95 KG/M2 | HEART RATE: 70 BPM | WEIGHT: 138 LBS

## 2024-07-29 DIAGNOSIS — I10 ESSENTIAL (PRIMARY) HYPERTENSION: ICD-10-CM

## 2024-07-29 DIAGNOSIS — I25.10 ATHEROSCLEROTIC HEART DISEASE OF NATIVE CORONARY ARTERY W/OUT ANGINA PECTORIS: ICD-10-CM

## 2024-07-29 DIAGNOSIS — Z01.818 ENCOUNTER FOR OTHER PREPROCEDURAL EXAMINATION: ICD-10-CM

## 2024-07-29 DIAGNOSIS — R94.31 ABNORMAL ELECTROCARDIOGRAM [ECG] [EKG]: ICD-10-CM

## 2024-07-29 PROCEDURE — 99215 OFFICE O/P EST HI 40 MIN: CPT

## 2024-07-29 PROCEDURE — G2211 COMPLEX E/M VISIT ADD ON: CPT

## 2024-07-29 PROCEDURE — 93000 ELECTROCARDIOGRAM COMPLETE: CPT | Mod: NC

## 2024-07-29 RX ORDER — AMLODIPINE BESYLATE 5 MG/1
5 TABLET ORAL DAILY
Qty: 90 | Refills: 1 | Status: ACTIVE | COMMUNITY
Start: 2024-07-29

## 2024-07-30 LAB
BASOPHILS # BLD AUTO: 0.08 K/UL
BASOPHILS NFR BLD AUTO: 0.5 %
EOSINOPHIL # BLD AUTO: 0.17 K/UL
EOSINOPHIL NFR BLD AUTO: 1 %
ESTIMATED AVERAGE GLUCOSE: 126 MG/DL
GLUCOSE SERPL-MCNC: 101 MG/DL
HBA1C MFR BLD HPLC: 6 %
HCT VFR BLD CALC: 29.4 %
HGB BLD-MCNC: 9.1 G/DL
IMM GRANULOCYTES NFR BLD AUTO: 0.2 %
LYMPHOCYTES # BLD AUTO: 10.26 K/UL
LYMPHOCYTES NFR BLD AUTO: 62.8 %
MAN DIFF?: NORMAL
MCHC RBC-ENTMCNC: 20 PG
MCHC RBC-ENTMCNC: 31 GM/DL
MCV RBC AUTO: 64.6 FL
MONOCYTES # BLD AUTO: 0.78 K/UL
MONOCYTES NFR BLD AUTO: 4.8 %
NEUTROPHILS # BLD AUTO: 5.01 K/UL
NEUTROPHILS NFR BLD AUTO: 30.7 %
PLATELET # BLD AUTO: 216 K/UL
RBC # BLD: 4.55 M/UL
RBC # FLD: 17.5 %
WBC # FLD AUTO: 16.33 K/UL

## 2024-07-30 NOTE — REVIEW OF SYSTEMS
[Negative] : Heme/Lymph [Weight Loss (___ Lbs)] : [unfilled] ~Ulb weight loss [Weight Gain (___ Lbs)] : no recent weight gain [FreeTextEntry3] : Needing bilateral cataract surgery

## 2024-07-30 NOTE — DISCUSSION/SUMMARY
[Procedure Low Risk] : the procedure risk is low [Patient Intermediate Risk] : the patient is an intermediate risk [Optimized for Surgery Pending Laboratory Results] : the patient is optimized for surgery pending laboratory results [As per surgery] : as per surgery [Continue] : Continue medications as currently directed [Optimized for Surgery] : the patient is optimized for surgery [FreeTextEntry3] : Hold Januvia and metformin the morning of the procedure [FreeTextEntry1] : Patient with known CAD with stents but has been very stable from a cardiac point of view.  Exam and ECG unchanged.  Also has a mild form of HOCM with no significant outflow tract gradient and this should not be an significant risk factor for her procedure and anesthesia.  He should remain on all her medications although her diabetic medicines could be stopped the morning of the procedure as she will be n.p.o.

## 2024-07-30 NOTE — HISTORY OF PRESENT ILLNESS
[Preoperative Visit] : for a medical evaluation prior to surgery [Scheduled Procedure ___] : a [unfilled] [Date of Surgery ___] : on [unfilled] [Surgeon Name ___] : surgeon: [unfilled] [de-identified] : Cataract extraction with intraocular lens implant [FreeTextEntry1] : The patient was seen on July 18, 2017 with complaints of chest pain and abnormal EKG. She was sent to the hospital where she had a cardiac catheterization. This led to stenting of a severe right coronary artery lesions. The patient has had no further chest pain. She has had no further arm pain.  August 15, 2017. Patient is here in followup. She feels well since the stent. We reviewed all her medications. She still has a little swelling in the groin area from her angiogram, but it is not getting bigger or spreading. May 23, 2018. The patient here in followup. She stopped her Plavix in October after seeing Dr. Green. Gets occasional chest pain down the middle of her sternum to her abdomen when she lies down at night, but no exertional symptoms. August 13, 2018. The patient returns in followup and for stress echocardiogram. Stress echo showed no ischemia, normal LV function with just minimal MR, mild AI, some features of possible HOCM, but no significant outflow tract gradient. Blood pressure was a little high, as was heart rate, but she had held her beta blocker for the stress test. She was concerned about very mild pedal edema that has developed. I explained to her and her daughter about venous insufficiency and reassured them. Consider support stockings if necessary. February 26, 2019. Patient returns in followup. EKG mostly normal with some APCs and nonspecific ST changes with minimal T-wave inversions V3 through 6. She has no complaints but the daughter was concerned that her blood pressure was consistently high, even at Dr. Trevor Tang, where it is borderline to high. We reviewed all her medication and I would first increase her metoprolol given her hypertrophic features and K+ 5.1. June 26, 2019. Patient returns in followup. She remains in sinus rhythm at 71 with minimally flattened to inverted T waves III and aVF, and tiny, inverted T waves V3 through 6. She is doing very well, working in the garden all the time, doing a lot of walking, etc., without any symptoms or complaints. Her blood pressure was elevated here again but at her cancer doctor it was totally normal. October 23, 2019.  Patient returns in follow-up.  Remains in sinus rhythm at 71 with some diffuse flattened or inverted T waves.  Walking every day without symptoms.  Here with her daughter and they had an upsetting conversation on the way here and her blood pressure is somewhat high but did come down by the end of the exam.  Her blood pressure at Dr. Trevor Tang was normal according to the patient and daughter.  No other complaints June 25, 2020.  Patient returns in follow-up with her daughter.  EKG has sinus rhythm at 72 with slight ST-T depression in 1 and aVL along with LVH voltage in lead I and inverted T waves in V3 through 6, definitely more prominent than prior EKG. around mid April it sounds like she may have had a hypertensive emergency as her pressure was something like 250/150 and supposedly verified by a nurse who lives upstairs and she was having problems with her gait etc.  They dealt with Dr. Trevor Tang with tele-visits and he increase metoprolol ER to twice a day and gave her telmisartan 100 instead of irbesartan and when she had a little edema he added hydrochlorothiazide 12.5.  Reportedly her blood pressure came down and she has been fine since.  She denies increase in salt intake although presumably her diet was different being isolated from COVID etc.  Here today the blood pressure is 152/78 and as mentioned she has the deeper T wave inversions.  She seems back to her baseline in every other way and the daughter agrees.  No exertional chest pain or shortness of breath.  Labs were sent and she will return for an echo Labs were unremarkable with normal kidney function, pro BNP only 418, excellent lipid profile and hemoglobin A1c 5.9.  Chronic microcytic anemia but her white count was slightly up at 15.6. August 20, 2020.  Patient returns in follow-up and has done well since her episode in July.  Blood pressure here was excellent.. Echo mostly unchanged with normal LV size and function, basal septal hypertrophy but no MED., ascending aorta slightly larger and AI slightly increased as is RVSP.  EKG unchanged and still has T wave inversions 1 aVL V3 through 6 August 24, 2021.  Patient here for follow-up first time in over 1 year.  Mostly because of Covid; she herself had no issues and has been vaccinated thanks to her daughter's efforts.  She recently saw Dr. Leigh and Dr. Trevor Tang and claims her blood pressure was good and her blood tests including cholesterol all within normal limits.  I will try to obtain them.  She denies exertional chest pain or shortness of breath.  Her abnormal EKG is unchanged with T wave inversions V3 through 6.  Her echo last year had mild to moderate AI so another echo will be repeated before the end of the year.  RVSP then was 44.  We reviewed all her medications and there has been no change 2021-11-16.Returns for echo.  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 or LVOT gradient.  Mild to moderate AI.  Ascending aorta measured 3.6 (down from 4.0 last echo) normal LV systolic function with LVEF 67%.  Normal RV function.  August 16, 2022.  First visit in almost 1 year.  Remains in sinus rhythm at 64 with LVH voltage in the limb leads and with slight T wave inversions V3 through 6 as well as 3 and aVF.  She herself has no complaints.  The daughter mentioned that the insurance made them change the Janumet to Januvia 5 mg and metformin 1000 mg both once daily.  She said her blood pressure with the internist is always a little elevated but has been constant and the same so "not to worry about it".  We reviewed all her medications as well as her blood pressure here and I would like the blood pressure control a little better so I suggested they talk to the internist about adding amlodipine either 2.5 or 5 mg.  Meanwhile at some point she should come back for a stress echocardiogram. May 2, 2023. Patient returns with her daughter for stress echocardiogram. We had to devise a manual protocol which she only went 0.8 mph but we kept increasing the elevation until we got 20 degrees elevated. At that point we reached past her target heart rate getting 133 and a blood pressure of 162/80. No symptoms. Her resting ECG has ST and T wave depressions 2 3 aVF V5 and V6 and these did not really change with exercise, maybe less than a millimeter increase in depression. Absolutely no VPCs. Absolutely no chest pain or severe shortness of breath. Absolutely no echocardiographic evidence of ischemia. Her resting echo has mild MR, maybe some S.A.M. but no LVOT gradient. Concentric LVH with some diastolic dysfunction. Normal systolic function and normal RV function. Mild TR with RVSP 29. Mild to moderate AI. Mostly unchanged from previous echo. Patient and daughter reassured.  January 23, 2024.  Patient returns in follow-up.  No interval medical or cardiac issues.  No complaints.  Here with her daughter and in good spirits.  Reviewed her last stress and echo.  July 29, 2024.  Patient here for clearance for cataract surgery which is scheduled for August 8 and then subsequently August 22 of this year. Initial blood pressure was elevated to 162/76 and by the end of the exam no change.  She claims it was better at the ophthalmologist and also at her internist Dr. Trevor Tang just a few weeks ago.  Reviewing her medications it seems she is not on telmisartan 80 mg and the daughter will be checking with Dr. Tang to see if this was discontinued for any reason.  She is on amlodipine 5 mg for her blood pressure and HCTZ 12.5. EKG is sinus rhythm at 70 with ST T wave depressions leads I, II and aVL along with T wave inversions in V2 through 6.  These are pretty much unchanged from previously.  She is very active in her garden with no chest pain shortness of breath etc.

## 2024-07-30 NOTE — ASSESSMENT
[FreeTextEntry1] : The patient is doing well since her angioplasty and stent on July 18, 2017. Remains on aspirin 81 mg. No recurrence of chest pain. No CHF. Blood pressure is higher than acceptable here but better elsewhere. (Was elevated last time here but she and her daughter insisted it was because of a big fight they had on the way over. However during the social isolation for COVID-19 she seemed to have a hypertensive episode with some mental status and or gait abnormalities that seemed to resolve without sequelae as her blood pressure came down. It was monitored by Dr. Trevor Tang with telehealth visits and he changed her medicine as noted. Exam is otherwise normal. (LVEF only 40% at time of acute ACS but normal on echo 8/2018. Minor features of HOCM noted and mild AI.) Metoprolol ER was increased to 50 mg bid. Changed from irbesartan to Telmisartan 100 (potassium was 5.1 last time. Abnormal baseline ECG, false positive during exercise with no evidence of ischemia on stress echo.) Returned August 16, 2022 with no complaints. Blood pressure reportedly well controlled; high here and now the daughter says that it is always a little high by Dr. Trevor Tang and Dr. Leigh but consistent. I told him I really would like her pressure to be better. I would consider adding amlodipine and they will speak with Dr. Tang about this (I gave them a note to give to him). Meanwhile they will come back for a stress echocardiogram and we can also assess her blood pressure response to exercise as well as monitor her moderate aortic insufficiency. Today's EKG still with T wave inversions V3 through 6 as well as 3 and aVF and is unchanged. Last year's echo virtually unchanged from study 2-1/2 years prior, although ascending aorta was slightly larger and aortic insufficiency slightly worse. Will repeat echo with stress test. No exertional symptoms. Continue aspirin; Dr. Green stopped Plavix in October, 2018. Patient came back for stress echocardiogram on May 2, 2023. No evidence of ischemia, some MED but no LVOT gradient. Some diastolic dysfunction. Mild MR, mild TR, mild to moderate AI. Unchanged from previous echo. Patient returned January 23, 2024. Here with daughter. Good spirits and no complaints whatsoever. Exam and EKG pretty much unchanged. Labs will be sent. If no change in BNP and no new symptoms we will just follow-up in 6 months.  Patient returns today July 29, 2024 with her daughter.  She needs preop clearance for bilateral cataract surgery 1 on August 8 and 1 in August 22.  Good spirits and no complaints, active in her garden etc.  Abnormal ECG without change physical exam is totally unchanged but her blood pressure is running high.  Based on the daughter's list she is on her amlodipine 5 mg and other medications but not on telmisartan.  She will try to find out if this was stopped by Dr. Trevor Tang.  She will also find out what the blood pressure was at Dr. Tang a few weeks ago because she thinks it was okay and she says it was okay at the ophthalmologist.  It is high here but it is often high here.  Otherwise she seems stable for the upcoming surgery and anesthesia.

## 2024-07-30 NOTE — PHYSICAL EXAM
[General Appearance - Well Developed] : well developed [Normal Appearance] : normal appearance [Well Groomed] : well groomed [General Appearance - Well Nourished] : well nourished [No Deformities] : no deformities [General Appearance - In No Acute Distress] : no acute distress [Normal Conjunctiva] : the conjunctiva exhibited no abnormalities [Eyelids - No Xanthelasma] : the eyelids demonstrated no xanthelasmas [Normal Oral Mucosa] : normal oral mucosa [No Oral Pallor] : no oral pallor [No Oral Cyanosis] : no oral cyanosis [Normal Jugular Venous A Waves Present] : normal jugular venous A waves present [Normal Jugular Venous V Waves Present] : normal jugular venous V waves present [No Jugular Venous Mazariegos A Waves] : no jugular venous mazariegos A waves [Respiration, Rhythm And Depth] : normal respiratory rhythm and effort [Exaggerated Use Of Accessory Muscles For Inspiration] : no accessory muscle use [Auscultation Breath Sounds / Voice Sounds] : lungs were clear to auscultation bilaterally [Heart Rate And Rhythm] : heart rate and rhythm were normal [Heart Sounds] : normal S1 and S2 [Murmurs] : no murmurs present [Arterial Pulses Normal] : the arterial pulses were normal [Edema] : no peripheral edema present [Abdomen Tenderness] : non-tender [Abdomen Soft] : soft [Abdomen Mass (___ Cm)] : no abdominal mass palpated [Abnormal Walk] : normal gait [Gait - Sufficient For Exercise Testing] : the gait was sufficient for exercise testing [Nail Clubbing] : no clubbing of the fingernails [Cyanosis, Localized] : no localized cyanosis [Petechial Hemorrhages (___cm)] : no petechial hemorrhages [Skin Color & Pigmentation] : normal skin color and pigmentation [] : no rash [No Venous Stasis] : no venous stasis [Skin Lesions] : no skin lesions [No Skin Ulcers] : no skin ulcer [No Xanthoma] : no  xanthoma was observed [Oriented To Time, Place, And Person] : oriented to person, place, and time [Affect] : the affect was normal [Mood] : the mood was normal [No Anxiety] : not feeling anxious [FreeTextEntry1] : Good spirits.

## 2024-07-30 NOTE — HISTORY OF PRESENT ILLNESS
[Preoperative Visit] : for a medical evaluation prior to surgery [Scheduled Procedure ___] : a [unfilled] [Date of Surgery ___] : on [unfilled] [Surgeon Name ___] : surgeon: [unfilled] [de-identified] : Cataract extraction with intraocular lens implant [FreeTextEntry1] : The patient was seen on July 18, 2017 with complaints of chest pain and abnormal EKG. She was sent to the hospital where she had a cardiac catheterization. This led to stenting of a severe right coronary artery lesions. The patient has had no further chest pain. She has had no further arm pain.  August 15, 2017. Patient is here in followup. She feels well since the stent. We reviewed all her medications. She still has a little swelling in the groin area from her angiogram, but it is not getting bigger or spreading. May 23, 2018. The patient here in followup. She stopped her Plavix in October after seeing Dr. Green. Gets occasional chest pain down the middle of her sternum to her abdomen when she lies down at night, but no exertional symptoms. August 13, 2018. The patient returns in followup and for stress echocardiogram. Stress echo showed no ischemia, normal LV function with just minimal MR, mild AI, some features of possible HOCM, but no significant outflow tract gradient. Blood pressure was a little high, as was heart rate, but she had held her beta blocker for the stress test. She was concerned about very mild pedal edema that has developed. I explained to her and her daughter about venous insufficiency and reassured them. Consider support stockings if necessary. February 26, 2019. Patient returns in followup. EKG mostly normal with some APCs and nonspecific ST changes with minimal T-wave inversions V3 through 6. She has no complaints but the daughter was concerned that her blood pressure was consistently high, even at Dr. Trevor Tang, where it is borderline to high. We reviewed all her medication and I would first increase her metoprolol given her hypertrophic features and K+ 5.1. June 26, 2019. Patient returns in followup. She remains in sinus rhythm at 71 with minimally flattened to inverted T waves III and aVF, and tiny, inverted T waves V3 through 6. She is doing very well, working in the garden all the time, doing a lot of walking, etc., without any symptoms or complaints. Her blood pressure was elevated here again but at her cancer doctor it was totally normal. October 23, 2019.  Patient returns in follow-up.  Remains in sinus rhythm at 71 with some diffuse flattened or inverted T waves.  Walking every day without symptoms.  Here with her daughter and they had an upsetting conversation on the way here and her blood pressure is somewhat high but did come down by the end of the exam.  Her blood pressure at Dr. Trevor Tang was normal according to the patient and daughter.  No other complaints June 25, 2020.  Patient returns in follow-up with her daughter.  EKG has sinus rhythm at 72 with slight ST-T depression in 1 and aVL along with LVH voltage in lead I and inverted T waves in V3 through 6, definitely more prominent than prior EKG. around mid April it sounds like she may have had a hypertensive emergency as her pressure was something like 250/150 and supposedly verified by a nurse who lives upstairs and she was having problems with her gait etc.  They dealt with Dr. Trevor Tang with tele-visits and he increase metoprolol ER to twice a day and gave her telmisartan 100 instead of irbesartan and when she had a little edema he added hydrochlorothiazide 12.5.  Reportedly her blood pressure came down and she has been fine since.  She denies increase in salt intake although presumably her diet was different being isolated from COVID etc.  Here today the blood pressure is 152/78 and as mentioned she has the deeper T wave inversions.  She seems back to her baseline in every other way and the daughter agrees.  No exertional chest pain or shortness of breath.  Labs were sent and she will return for an echo Labs were unremarkable with normal kidney function, pro BNP only 418, excellent lipid profile and hemoglobin A1c 5.9.  Chronic microcytic anemia but her white count was slightly up at 15.6. August 20, 2020.  Patient returns in follow-up and has done well since her episode in July.  Blood pressure here was excellent.. Echo mostly unchanged with normal LV size and function, basal septal hypertrophy but no MED., ascending aorta slightly larger and AI slightly increased as is RVSP.  EKG unchanged and still has T wave inversions 1 aVL V3 through 6 August 24, 2021.  Patient here for follow-up first time in over 1 year.  Mostly because of Covid; she herself had no issues and has been vaccinated thanks to her daughter's efforts.  She recently saw Dr. Leigh and Dr. Trevor Tang and claims her blood pressure was good and her blood tests including cholesterol all within normal limits.  I will try to obtain them.  She denies exertional chest pain or shortness of breath.  Her abnormal EKG is unchanged with T wave inversions V3 through 6.  Her echo last year had mild to moderate AI so another echo will be repeated before the end of the year.  RVSP then was 44.  We reviewed all her medications and there has been no change 2021-11-16.Returns for echo.  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 or LVOT gradient.  Mild to moderate AI.  Ascending aorta measured 3.6 (down from 4.0 last echo) normal LV systolic function with LVEF 67%.  Normal RV function.  August 16, 2022.  First visit in almost 1 year.  Remains in sinus rhythm at 64 with LVH voltage in the limb leads and with slight T wave inversions V3 through 6 as well as 3 and aVF.  She herself has no complaints.  The daughter mentioned that the insurance made them change the Janumet to Januvia 5 mg and metformin 1000 mg both once daily.  She said her blood pressure with the internist is always a little elevated but has been constant and the same so "not to worry about it".  We reviewed all her medications as well as her blood pressure here and I would like the blood pressure control a little better so I suggested they talk to the internist about adding amlodipine either 2.5 or 5 mg.  Meanwhile at some point she should come back for a stress echocardiogram. May 2, 2023. Patient returns with her daughter for stress echocardiogram. We had to devise a manual protocol which she only went 0.8 mph but we kept increasing the elevation until we got 20 degrees elevated. At that point we reached past her target heart rate getting 133 and a blood pressure of 162/80. No symptoms. Her resting ECG has ST and T wave depressions 2 3 aVF V5 and V6 and these did not really change with exercise, maybe less than a millimeter increase in depression. Absolutely no VPCs. Absolutely no chest pain or severe shortness of breath. Absolutely no echocardiographic evidence of ischemia. Her resting echo has mild MR, maybe some S.A.M. but no LVOT gradient. Concentric LVH with some diastolic dysfunction. Normal systolic function and normal RV function. Mild TR with RVSP 29. Mild to moderate AI. Mostly unchanged from previous echo. Patient and daughter reassured.  January 23, 2024.  Patient returns in follow-up.  No interval medical or cardiac issues.  No complaints.  Here with her daughter and in good spirits.  Reviewed her last stress and echo.  July 29, 2024.  Patient here for clearance for cataract surgery which is scheduled for August 8 and then subsequently August 22 of this year. Initial blood pressure was elevated to 162/76 and by the end of the exam no change.  She claims it was better at the ophthalmologist and also at her internist Dr. Trevor Tang just a few weeks ago.  Reviewing her medications it seems she is not on telmisartan 80 mg and the daughter will be checking with Dr. Tang to see if this was discontinued for any reason.  She is on amlodipine 5 mg for her blood pressure and HCTZ 12.5. EKG is sinus rhythm at 70 with ST T wave depressions leads I, II and aVL along with T wave inversions in V2 through 6.  These are pretty much unchanged from previously.  She is very active in her garden with no chest pain shortness of breath etc.

## 2024-07-31 LAB
ALBUMIN SERPL ELPH-MCNC: 4.6 G/DL
ALP BLD-CCNC: 34 U/L
ALT SERPL-CCNC: 10 U/L
ANION GAP SERPL CALC-SCNC: 14 MMOL/L
AST SERPL-CCNC: 15 U/L
BILIRUB SERPL-MCNC: 0.6 MG/DL
BUN SERPL-MCNC: 18 MG/DL
CALCIUM SERPL-MCNC: 10.3 MG/DL
CHLORIDE SERPL-SCNC: 98 MMOL/L
CHOLEST SERPL-MCNC: 114 MG/DL
CO2 SERPL-SCNC: 24 MMOL/L
CREAT SERPL-MCNC: 0.96 MG/DL
EGFR: 59 ML/MIN/1.73M2
HDLC SERPL-MCNC: 47 MG/DL
LDLC SERPL CALC-MCNC: 45 MG/DL
NONHDLC SERPL-MCNC: 68 MG/DL
NT-PROBNP SERPL-MCNC: 376 PG/ML
POTASSIUM SERPL-SCNC: 4.8 MMOL/L
PROT SERPL-MCNC: 6.3 G/DL
SODIUM SERPL-SCNC: 136 MMOL/L
TRIGL SERPL-MCNC: 129 MG/DL
TSH SERPL-ACNC: 3.76 UIU/ML

## 2025-04-11 ENCOUNTER — LABORATORY RESULT (OUTPATIENT)
Age: 85
End: 2025-04-11

## 2025-04-11 ENCOUNTER — APPOINTMENT (OUTPATIENT)
Dept: CARDIOLOGY | Facility: CLINIC | Age: 85
End: 2025-04-11
Payer: MEDICARE

## 2025-04-11 ENCOUNTER — NON-APPOINTMENT (OUTPATIENT)
Age: 85
End: 2025-04-11

## 2025-04-11 VITALS
HEART RATE: 66 BPM | OXYGEN SATURATION: 97 % | SYSTOLIC BLOOD PRESSURE: 132 MMHG | WEIGHT: 135 LBS | BODY MASS INDEX: 26.37 KG/M2 | DIASTOLIC BLOOD PRESSURE: 62 MMHG

## 2025-04-11 DIAGNOSIS — I51.7 CARDIOMEGALY: ICD-10-CM

## 2025-04-11 DIAGNOSIS — I25.10 ATHEROSCLEROTIC HEART DISEASE OF NATIVE CORONARY ARTERY W/OUT ANGINA PECTORIS: ICD-10-CM

## 2025-04-11 DIAGNOSIS — I10 ESSENTIAL (PRIMARY) HYPERTENSION: ICD-10-CM

## 2025-04-11 DIAGNOSIS — I35.1 NONRHEUMATIC AORTIC (VALVE) INSUFFICIENCY: ICD-10-CM

## 2025-04-11 PROCEDURE — G2211 COMPLEX E/M VISIT ADD ON: CPT

## 2025-04-11 PROCEDURE — 99214 OFFICE O/P EST MOD 30 MIN: CPT

## 2025-04-11 PROCEDURE — 93000 ELECTROCARDIOGRAM COMPLETE: CPT

## 2025-04-15 LAB
ALBUMIN SERPL ELPH-MCNC: 4.6 G/DL
ALP BLD-CCNC: 33 U/L
ALT SERPL-CCNC: 14 U/L
ANION GAP SERPL CALC-SCNC: 16 MMOL/L
AST SERPL-CCNC: 14 U/L
BASOPHILS # BLD AUTO: 0.07 K/UL
BASOPHILS NFR BLD AUTO: 0.4 %
BILIRUB SERPL-MCNC: 0.6 MG/DL
BUN SERPL-MCNC: 20 MG/DL
CALCIUM SERPL-MCNC: 10.4 MG/DL
CHLORIDE SERPL-SCNC: 97 MMOL/L
CHOLEST SERPL-MCNC: 115 MG/DL
CO2 SERPL-SCNC: 24 MMOL/L
CREAT SERPL-MCNC: 1.01 MG/DL
EGFRCR SERPLBLD CKD-EPI 2021: 56 ML/MIN/1.73M2
EOSINOPHIL # BLD AUTO: 0.26 K/UL
EOSINOPHIL NFR BLD AUTO: 1.5 %
ESTIMATED AVERAGE GLUCOSE: 131 MG/DL
GLUCOSE SERPL-MCNC: 129 MG/DL
HBA1C MFR BLD HPLC: 6.2 %
HCT VFR BLD CALC: 31.2 %
HDLC SERPL-MCNC: 51 MG/DL
HGB BLD-MCNC: 9.4 G/DL
IMM GRANULOCYTES NFR BLD AUTO: 0.2 %
LDLC SERPL-MCNC: 46 MG/DL
LYMPHOCYTES # BLD AUTO: 11.17 K/UL
LYMPHOCYTES NFR BLD AUTO: 63.8 %
MAN DIFF?: NORMAL
MCHC RBC-ENTMCNC: 18.9 PG
MCHC RBC-ENTMCNC: 30.1 G/DL
MCV RBC AUTO: 62.8 FL
MONOCYTES # BLD AUTO: 1.1 K/UL
MONOCYTES NFR BLD AUTO: 6.3 %
NEUTROPHILS # BLD AUTO: 4.87 K/UL
NEUTROPHILS NFR BLD AUTO: 27.8 %
NONHDLC SERPL-MCNC: 64 MG/DL
NT-PROBNP SERPL-MCNC: 456 PG/ML
PLATELET # BLD AUTO: 234 K/UL
POTASSIUM SERPL-SCNC: 4.9 MMOL/L
PROT SERPL-MCNC: 6.5 G/DL
RBC # BLD: 4.97 M/UL
RBC # FLD: 16.7 %
SODIUM SERPL-SCNC: 137 MMOL/L
TRIGL SERPL-MCNC: 92 MG/DL
TSH SERPL-ACNC: 4.32 UIU/ML
WBC # FLD AUTO: 17.5 K/UL